# Patient Record
Sex: FEMALE | Race: WHITE | NOT HISPANIC OR LATINO | Employment: FULL TIME | URBAN - METROPOLITAN AREA
[De-identification: names, ages, dates, MRNs, and addresses within clinical notes are randomized per-mention and may not be internally consistent; named-entity substitution may affect disease eponyms.]

---

## 2017-03-02 RX ORDER — LEVOTHYROXINE SODIUM 0.15 MG/1
TABLET ORAL
Qty: 90 TAB | Refills: 0 | Status: SHIPPED | OUTPATIENT
Start: 2017-03-02 | End: 2017-06-04 | Stop reason: SDUPTHER

## 2017-03-02 RX ORDER — GLIPIZIDE 5 MG/1
TABLET ORAL
Qty: 180 TAB | Refills: 0 | Status: SHIPPED | OUTPATIENT
Start: 2017-03-02 | End: 2017-06-04 | Stop reason: SDUPTHER

## 2017-04-01 ENCOUNTER — APPOINTMENT (OUTPATIENT)
Dept: RADIOLOGY | Facility: MEDICAL CENTER | Age: 62
DRG: 690 | End: 2017-04-01
Attending: EMERGENCY MEDICINE
Payer: COMMERCIAL

## 2017-04-01 ENCOUNTER — RESOLUTE PROFESSIONAL BILLING HOSPITAL PROF FEE (OUTPATIENT)
Dept: HOSPITALIST | Facility: MEDICAL CENTER | Age: 62
End: 2017-04-01
Payer: COMMERCIAL

## 2017-04-01 ENCOUNTER — HOSPITAL ENCOUNTER (INPATIENT)
Facility: MEDICAL CENTER | Age: 62
LOS: 2 days | DRG: 690 | End: 2017-04-03
Attending: EMERGENCY MEDICINE | Admitting: HOSPITALIST
Payer: COMMERCIAL

## 2017-04-01 PROBLEM — N39.0 UTI (URINARY TRACT INFECTION): Status: ACTIVE | Noted: 2017-04-01

## 2017-04-01 PROBLEM — N20.0 RIGHT NEPHROLITHIASIS: Status: ACTIVE | Noted: 2017-04-01

## 2017-04-01 PROBLEM — N13.30 HYDRONEPHROSIS, RIGHT: Status: ACTIVE | Noted: 2017-04-01

## 2017-04-01 LAB
AMPHET UR QL SCN: NEGATIVE
ANION GAP SERPL CALC-SCNC: 13 MMOL/L (ref 0–11.9)
APPEARANCE UR: ABNORMAL
BACTERIA #/AREA URNS HPF: ABNORMAL /HPF
BARBITURATES UR QL SCN: NEGATIVE
BASOPHILS # BLD AUTO: 0.4 % (ref 0–1.8)
BASOPHILS # BLD: 0.07 K/UL (ref 0–0.12)
BENZODIAZ UR QL SCN: NEGATIVE
BILIRUB UR QL STRIP.AUTO: NEGATIVE
BUN SERPL-MCNC: 14 MG/DL (ref 8–22)
BZE UR QL SCN: NEGATIVE
CALCIUM SERPL-MCNC: 10.1 MG/DL (ref 8.5–10.5)
CANNABINOIDS UR QL SCN: NEGATIVE
CHLORIDE SERPL-SCNC: 104 MMOL/L (ref 96–112)
CO2 SERPL-SCNC: 23 MMOL/L (ref 20–33)
COLOR UR: ABNORMAL
CREAT SERPL-MCNC: 1.01 MG/DL (ref 0.5–1.4)
CULTURE IF INDICATED INDCX: YES UA CULTURE
EOSINOPHIL # BLD AUTO: 0.05 K/UL (ref 0–0.51)
EOSINOPHIL NFR BLD: 0.3 % (ref 0–6.9)
EPI CELLS #/AREA URNS HPF: ABNORMAL /HPF
ERYTHROCYTE [DISTWIDTH] IN BLOOD BY AUTOMATED COUNT: 40.3 FL (ref 35.9–50)
GFR SERPL CREATININE-BSD FRML MDRD: 56 ML/MIN/1.73 M 2
GLUCOSE SERPL-MCNC: 151 MG/DL (ref 65–99)
GLUCOSE UR STRIP.AUTO-MCNC: NEGATIVE MG/DL
HCT VFR BLD AUTO: 44.1 % (ref 37–47)
HGB BLD-MCNC: 15.1 G/DL (ref 12–16)
IMM GRANULOCYTES # BLD AUTO: 0.07 K/UL (ref 0–0.11)
IMM GRANULOCYTES NFR BLD AUTO: 0.4 % (ref 0–0.9)
KETONES UR STRIP.AUTO-MCNC: NEGATIVE MG/DL
LEUKOCYTE ESTERASE UR QL STRIP.AUTO: ABNORMAL
LYMPHOCYTES # BLD AUTO: 1.29 K/UL (ref 1–4.8)
LYMPHOCYTES NFR BLD: 7.8 % (ref 22–41)
MCH RBC QN AUTO: 29.7 PG (ref 27–33)
MCHC RBC AUTO-ENTMCNC: 34.2 G/DL (ref 33.6–35)
MCV RBC AUTO: 86.8 FL (ref 81.4–97.8)
MDMA UR QL SCN: NEGATIVE
METHADONE UR QL SCN: NEGATIVE
MICRO URNS: ABNORMAL
MONOCYTES # BLD AUTO: 0.81 K/UL (ref 0–0.85)
MONOCYTES NFR BLD AUTO: 4.9 % (ref 0–13.4)
MUCOUS THREADS #/AREA URNS HPF: ABNORMAL /HPF
NEUTROPHILS # BLD AUTO: 14.22 K/UL (ref 2–7.15)
NEUTROPHILS NFR BLD: 86.2 % (ref 44–72)
NITRITE UR QL STRIP.AUTO: NEGATIVE
NRBC # BLD AUTO: 0 K/UL
NRBC BLD AUTO-RTO: 0 /100 WBC
OPIATES UR QL SCN: NEGATIVE
OXYCODONE UR QL SCN: NEGATIVE
PCP UR QL SCN: NEGATIVE
PH UR STRIP.AUTO: 5.5 [PH]
PLATELET # BLD AUTO: 267 K/UL (ref 164–446)
PMV BLD AUTO: 10.6 FL (ref 9–12.9)
POTASSIUM SERPL-SCNC: 4 MMOL/L (ref 3.6–5.5)
PROPOXYPH UR QL SCN: NEGATIVE
PROT UR QL STRIP: NEGATIVE MG/DL
RBC # BLD AUTO: 5.08 M/UL (ref 4.2–5.4)
RBC # URNS HPF: ABNORMAL /HPF
RBC UR QL AUTO: ABNORMAL
SODIUM SERPL-SCNC: 140 MMOL/L (ref 135–145)
SP GR UR STRIP.AUTO: 1.02
WBC # BLD AUTO: 16.5 K/UL (ref 4.8–10.8)
WBC #/AREA URNS HPF: >150 /HPF

## 2017-04-01 PROCEDURE — 85025 COMPLETE CBC W/AUTO DIFF WBC: CPT

## 2017-04-01 PROCEDURE — 700105 HCHG RX REV CODE 258: Performed by: EMERGENCY MEDICINE

## 2017-04-01 PROCEDURE — 81001 URINALYSIS AUTO W/SCOPE: CPT

## 2017-04-01 PROCEDURE — 96375 TX/PRO/DX INJ NEW DRUG ADDON: CPT

## 2017-04-01 PROCEDURE — 700102 HCHG RX REV CODE 250 W/ 637 OVERRIDE(OP): Performed by: EMERGENCY MEDICINE

## 2017-04-01 PROCEDURE — 74176 CT ABD & PELVIS W/O CONTRAST: CPT

## 2017-04-01 PROCEDURE — 96365 THER/PROPH/DIAG IV INF INIT: CPT

## 2017-04-01 PROCEDURE — 99223 1ST HOSP IP/OBS HIGH 75: CPT | Performed by: HOSPITALIST

## 2017-04-01 PROCEDURE — 700111 HCHG RX REV CODE 636 W/ 250 OVERRIDE (IP): Performed by: EMERGENCY MEDICINE

## 2017-04-01 PROCEDURE — 87186 SC STD MICRODIL/AGAR DIL: CPT

## 2017-04-01 PROCEDURE — 700111 HCHG RX REV CODE 636 W/ 250 OVERRIDE (IP): Performed by: HOSPITALIST

## 2017-04-01 PROCEDURE — 80048 BASIC METABOLIC PNL TOTAL CA: CPT

## 2017-04-01 PROCEDURE — 94760 N-INVAS EAR/PLS OXIMETRY 1: CPT

## 2017-04-01 PROCEDURE — 87077 CULTURE AEROBIC IDENTIFY: CPT

## 2017-04-01 PROCEDURE — 99285 EMERGENCY DEPT VISIT HI MDM: CPT

## 2017-04-01 PROCEDURE — 87086 URINE CULTURE/COLONY COUNT: CPT

## 2017-04-01 PROCEDURE — 80307 DRUG TEST PRSMV CHEM ANLYZR: CPT

## 2017-04-01 PROCEDURE — A9270 NON-COVERED ITEM OR SERVICE: HCPCS | Performed by: EMERGENCY MEDICINE

## 2017-04-01 PROCEDURE — 770006 HCHG ROOM/CARE - MED/SURG/GYN SEMI*

## 2017-04-01 RX ORDER — BISACODYL 10 MG
10 SUPPOSITORY, RECTAL RECTAL
Status: DISCONTINUED | OUTPATIENT
Start: 2017-04-01 | End: 2017-04-03 | Stop reason: HOSPADM

## 2017-04-01 RX ORDER — AMLODIPINE BESYLATE 10 MG/1
5 TABLET ORAL DAILY
Status: DISCONTINUED | OUTPATIENT
Start: 2017-04-02 | End: 2017-04-02

## 2017-04-01 RX ORDER — ASPIRIN 81 MG/1
81 TABLET, CHEWABLE ORAL DAILY
Status: DISCONTINUED | OUTPATIENT
Start: 2017-04-02 | End: 2017-04-03 | Stop reason: HOSPADM

## 2017-04-01 RX ORDER — OXYCODONE HYDROCHLORIDE 5 MG/1
5 TABLET ORAL
Status: DISCONTINUED | OUTPATIENT
Start: 2017-04-01 | End: 2017-04-03 | Stop reason: HOSPADM

## 2017-04-01 RX ORDER — MORPHINE SULFATE 4 MG/ML
4 INJECTION, SOLUTION INTRAMUSCULAR; INTRAVENOUS
Status: DISCONTINUED | OUTPATIENT
Start: 2017-04-01 | End: 2017-04-03 | Stop reason: HOSPADM

## 2017-04-01 RX ORDER — SODIUM CHLORIDE 9 MG/ML
1000 INJECTION, SOLUTION INTRAVENOUS ONCE
Status: COMPLETED | OUTPATIENT
Start: 2017-04-01 | End: 2017-04-01

## 2017-04-01 RX ORDER — PHENAZOPYRIDINE HYDROCHLORIDE 200 MG/1
100 TABLET, FILM COATED ORAL ONCE
Status: COMPLETED | OUTPATIENT
Start: 2017-04-01 | End: 2017-04-01

## 2017-04-01 RX ORDER — PROMETHAZINE HYDROCHLORIDE 25 MG/1
12.5-25 TABLET ORAL EVERY 4 HOURS PRN
Status: DISCONTINUED | OUTPATIENT
Start: 2017-04-01 | End: 2017-04-03 | Stop reason: HOSPADM

## 2017-04-01 RX ORDER — ONDANSETRON 2 MG/ML
4 INJECTION INTRAMUSCULAR; INTRAVENOUS EVERY 4 HOURS PRN
Status: DISCONTINUED | OUTPATIENT
Start: 2017-04-01 | End: 2017-04-03 | Stop reason: HOSPADM

## 2017-04-01 RX ORDER — SODIUM CHLORIDE 9 MG/ML
INJECTION, SOLUTION INTRAVENOUS CONTINUOUS
Status: DISCONTINUED | OUTPATIENT
Start: 2017-04-02 | End: 2017-04-03 | Stop reason: HOSPADM

## 2017-04-01 RX ORDER — OXYCODONE HYDROCHLORIDE 10 MG/1
10 TABLET ORAL
Status: DISCONTINUED | OUTPATIENT
Start: 2017-04-01 | End: 2017-04-03 | Stop reason: HOSPADM

## 2017-04-01 RX ORDER — POLYETHYLENE GLYCOL 3350 17 G/17G
1 POWDER, FOR SOLUTION ORAL
Status: DISCONTINUED | OUTPATIENT
Start: 2017-04-01 | End: 2017-04-03 | Stop reason: HOSPADM

## 2017-04-01 RX ORDER — TRAMADOL HYDROCHLORIDE 50 MG/1
50 TABLET ORAL ONCE
Status: DISCONTINUED | OUTPATIENT
Start: 2017-04-01 | End: 2017-04-01

## 2017-04-01 RX ORDER — PROMETHAZINE HYDROCHLORIDE 25 MG/1
12.5-25 SUPPOSITORY RECTAL EVERY 4 HOURS PRN
Status: DISCONTINUED | OUTPATIENT
Start: 2017-04-01 | End: 2017-04-03 | Stop reason: HOSPADM

## 2017-04-01 RX ORDER — ONDANSETRON 4 MG/1
4 TABLET, ORALLY DISINTEGRATING ORAL EVERY 4 HOURS PRN
Status: DISCONTINUED | OUTPATIENT
Start: 2017-04-01 | End: 2017-04-03 | Stop reason: HOSPADM

## 2017-04-01 RX ORDER — ACETAMINOPHEN 325 MG/1
650 TABLET ORAL EVERY 6 HOURS PRN
Status: DISCONTINUED | OUTPATIENT
Start: 2017-04-01 | End: 2017-04-03 | Stop reason: HOSPADM

## 2017-04-01 RX ORDER — ATORVASTATIN CALCIUM 10 MG/1
10 TABLET, FILM COATED ORAL DAILY
Status: DISCONTINUED | OUTPATIENT
Start: 2017-04-02 | End: 2017-04-03 | Stop reason: HOSPADM

## 2017-04-01 RX ORDER — AMOXICILLIN 250 MG
2 CAPSULE ORAL 2 TIMES DAILY
Status: DISCONTINUED | OUTPATIENT
Start: 2017-04-02 | End: 2017-04-03 | Stop reason: HOSPADM

## 2017-04-01 RX ORDER — KETOROLAC TROMETHAMINE 30 MG/ML
30 INJECTION, SOLUTION INTRAMUSCULAR; INTRAVENOUS ONCE
Status: COMPLETED | OUTPATIENT
Start: 2017-04-01 | End: 2017-04-01

## 2017-04-01 RX ORDER — LEVOTHYROXINE SODIUM 0.05 MG/1
150 TABLET ORAL
Status: DISCONTINUED | OUTPATIENT
Start: 2017-04-02 | End: 2017-04-03 | Stop reason: HOSPADM

## 2017-04-01 RX ORDER — DEXTROSE MONOHYDRATE 25 G/50ML
25 INJECTION, SOLUTION INTRAVENOUS
Status: DISCONTINUED | OUTPATIENT
Start: 2017-04-01 | End: 2017-04-03 | Stop reason: HOSPADM

## 2017-04-01 RX ORDER — LOSARTAN POTASSIUM 50 MG/1
100 TABLET ORAL DAILY
Status: DISCONTINUED | OUTPATIENT
Start: 2017-04-02 | End: 2017-04-03 | Stop reason: HOSPADM

## 2017-04-01 RX ORDER — CEFTRIAXONE 2 G/1
2 INJECTION, POWDER, FOR SOLUTION INTRAMUSCULAR; INTRAVENOUS ONCE
Status: COMPLETED | OUTPATIENT
Start: 2017-04-01 | End: 2017-04-01

## 2017-04-01 RX ORDER — ASPIRIN 81 MG/1
81 TABLET, CHEWABLE ORAL DAILY
COMMUNITY

## 2017-04-01 RX ADMIN — SODIUM CHLORIDE 1000 ML: 9 INJECTION, SOLUTION INTRAVENOUS at 20:27

## 2017-04-01 RX ADMIN — KETOROLAC TROMETHAMINE 30 MG: 30 INJECTION, SOLUTION INTRAMUSCULAR; INTRAVENOUS at 20:28

## 2017-04-01 RX ADMIN — CEFTRIAXONE SODIUM 2 G: 2 INJECTION, POWDER, FOR SOLUTION INTRAMUSCULAR; INTRAVENOUS at 20:32

## 2017-04-01 RX ADMIN — PHENAZOPYRIDINE HYDROCHLORIDE 100 MG: 200 TABLET ORAL at 20:34

## 2017-04-01 RX ADMIN — MORPHINE SULFATE 4 MG: 4 INJECTION INTRAVENOUS at 22:45

## 2017-04-01 ASSESSMENT — LIFESTYLE VARIABLES
ALCOHOL_USE: YES
EVER_SMOKED: YES
HAVE YOU EVER FELT YOU SHOULD CUT DOWN ON YOUR DRINKING: NO
TOTAL SCORE: 0
EVER FELT BAD OR GUILTY ABOUT YOUR DRINKING: NO
AVERAGE NUMBER OF DAYS PER WEEK YOU HAVE A DRINK CONTAINING ALCOHOL: 0
ON A TYPICAL DAY WHEN YOU DRINK ALCOHOL HOW MANY DRINKS DO YOU HAVE: 1
EVER HAD A DRINK FIRST THING IN THE MORNING TO STEADY YOUR NERVES TO GET RID OF A HANGOVER: NO
DO YOU DRINK ALCOHOL: NO
HOW MANY TIMES IN THE PAST YEAR HAVE YOU HAD 5 OR MORE DRINKS IN A DAY: 0
HAVE PEOPLE ANNOYED YOU BY CRITICIZING YOUR DRINKING: NO
CONSUMPTION TOTAL: NEGATIVE
TOTAL SCORE: 0
TOTAL SCORE: 0

## 2017-04-01 ASSESSMENT — PAIN SCALES - GENERAL: PAINLEVEL_OUTOF10: 7

## 2017-04-01 NOTE — IP AVS SNAPSHOT
4/3/2017          Devi Caruso  [address] [place] [address]    Dear Devi:    [facility] wants to ensure your discharge home is safe and you or your loved ones have had all your questions answered regarding your care after you leave the hospital.    You may receive a telephone call within two days of your discharge.  This call is to make certain you understand your discharge instructions as well as ensure we provided you with the best care possible during your stay with us.     The call will only last approximately 3-5 minutes and will be done by a nurse.    Once again, we want to ensure your discharge home is safe and that you have a clear understanding of any next steps in your care.  If you have any questions or concerns, please do not hesitate to contact us, we are here for you.  Thank you for choosing [facility] for your healthcare needs.    Sincerely,    Grupo Gold    [facility]

## 2017-04-01 NOTE — IP AVS SNAPSHOT
" <p align=\"LEFT\"><IMG SRC=\"//[email or web address]/blob$/[email or web address]\" alt=\"Image\" WIDTH=\"50%\" HEIGHT=\"200\" BORDER=\"\"></p>                   Name:Devi Caruso  Medical Record Number:[id number]  CSN: [id number]    YOB: 1955   Age: 62 y.o.  Sex: female  HT:1.676 m (5' 6\") WT: 105.2 kg (231 lb 14.8 oz)          Admit Date: 4/1/2017     Discharge Date:   Today's Date: 4/3/2017  Attending Doctor:  Kraig Vázquez M.D.                  Allergies:  Review of patient's allergies indicates no known allergies.          Follow-up Information     1. Follow up with Shon Chambers M.D.. Schedule an appointment as soon as possible for a visit in 1 week.    Specialty:  Urology    Contact information    [address] [place] [id number] [phone]          2. Follow up with SHER Guardado. Schedule an appointment as soon as possible for a visit in 1 week.    Specialty:  Family Medicine    Contact information    [address] [place] [address] [phone]           Medication List      Take these Medications        Instructions    amlodipine 5 MG Tabs   Commonly known as:  NORVASC    Take 1 Tab by mouth every day.   Dose:  5 mg       aspirin 81 MG Chew chewable tablet   Commonly known as:  ASA    Take 81 mg by mouth every day.   Dose:  81 mg       atorvastatin 10 MG Tabs   Commonly known as:  LIPITOR    Take 1 Tab by mouth every day.   Dose:  10 mg       ciprofloxacin 500 MG Tabs   Commonly known as:  CIPRO    Take 1 Tab by mouth 2 times a day for 9 days.   Dose:  500 mg       glipiZIDE 5 MG Tabs   Commonly known as:  GLUCOTROL    TAKE ONE TABLET BY MOUTH TWICE DAILY       levothyroxine 150 MCG Tabs   Commonly known as:  SYNTHROID    Take 150 mcg by mouth every evening.   Dose:  150 mcg       losartan 100 MG Tabs   Commonly known as:  COZAAR    Take 1 Tab by mouth every day.   Dose:  100 mg       metformin 1000 MG tablet   Commonly known as:  GLUCOPHAGE    Take 1 Tab by mouth 2 times a day, with " meals.   Dose:  1000 mg       MULTIVITAL PO    Take 1 Tab by mouth every day.   Dose:  1 Tab

## 2017-04-01 NOTE — IP AVS SNAPSHOT
" Home Care Instructions                                                                                                                  Name:Devi Caruso  Medical Record Number:[id number]  CSN: [id number]    YOB: 1955   Age: 62 y.o.  Sex: female  HT:1.676 m (5' 6\") WT: 105.2 kg (231 lb 14.8 oz)          Admit Date: 4/1/2017     Discharge Date:   Today's Date: 4/3/2017  Attending Doctor:  Kraig Vázquez M.D.                  Allergies:  Review of patient's allergies indicates no known allergies.            Discharge Instructions       YOB: 1955   Age: 62 y.o.               Admit Date: 4/1/2017     Discharge Date: 4/3/2017  Attending Doctor:  Kraig Vázquez M.D.                  Allergies:  Review of patient's allergies indicates no known allergies.  Medical History (as on file):   Past Medical History   Diagnosis Date   • Epistaxis 10/30/2013     6 bloody in last few days Not common for patient Lot of sinus trouble in the last week   • Unspecified hypothyroidism 10/30/2013   • Dyslipidemia 10/30/2013   • HTN (hypertension) 10/30/2013     Losartan 100 Doesn't check at home   • Diabetes ([facility])      oral meds   • Renal disorder      kidney stones     Past Surgical History   Procedure Laterality Date   • Cataract extraction with iol  2015   • Thyroidectomy  1985   • Mass excision general  7/14/2016     Procedure: MASS EXCISION GENERAL SUBCUTANEOUS NECK;  Surgeon: Bhavik Bowen M.D.;  Location: SURGERY [facility];  Service:    • Cystoscopy stent placement Right 4/2/2017     Procedure: CYSTOSCOPY STENT PLACEMENT;  Surgeon: Shon Chambers M.D.;  Location: SURGERY [facility];  Service:        Discharge Instructions  Blood Pressure: 125/72 mmHg  Weight: 105.2 kg (231 lb 14.8 oz)    Discharged to home by car with relative. Discharged via wheelchair, hospital escort: Yes.    Special equipment needed: Not Applicable    Belongings with: Personal    Instructions:    Follow up with Sherry" SHER Garduno In 1-2 weeks  Follow up with Dr. Chambers, Urology in 1 week.    Be sure to schedule a follow-up appointment with your primary care doctor or any specialists as instructed.     Discharge Plan:   Diet Plan: Discussed  Activity Level: Discussed  Confirmed Follow up Appointment: Patient to Call and Schedule Appointment  Confirmed Symptoms Management: Discussed  Medication Reconciliation Updated: Yes  Influenza Vaccine Indication: Patient Refuses    DIET:  You may eat any foods that you can tolerate.  It is a good idea to eat a high fiber diet and take in plenty of fluids to prevent constipation.  If you do become constipated you may want to take a mild laxative or take ducolax tablets on a daily basis until your bowel habits are regular.  Constipation can be very uncomfortable, along with straining, after recent surgery.    I understand that a diet low in cholesterol, fat, and sodium is recommended for good health. Unless I have been given specific instructions below for another diet, I accept this instruction as my diet prescription.       Discharge Medication Instructions:    Below are the medications your physician expects you to take upon discharge:    Review all your home medications and newly ordered medications with your doctor and/or pharmacist. Follow medication instructions as directed by your doctor and/or pharmacist.  GENERAL POST-OPERATIVE  PATIENT INSTRUCTIONS      FOLLOW-UP:  Please call your physician/ return to ER if you have any fevers greater than 100.4, drainage from your wound that is not clear or looks infected, persistent bleeding, increasing abdominal pain, problems urinating, or persistent nausea/vomiting.      WOUND CARE INSTRUCTIONS:  Keep a dry clean dressing on the wound if there is drainage. The initial bandage may be removed after 24 hours.  Once the wound has quit draining you may leave it open to air.  If clothing rubs against the wound or causes irritation and the  wound is not draining you may cover it with a dry dressing during the daytime.  Try to keep the wound dry and avoid ointments on the wound unless directed to do so.  If the wound becomes bright red and painful or starts to drain infected material that is not clear, please contact your physician immediately.  If the wound is mildly pink and has a thick firm ridge underneath it, this is normal, and is referred to as a healing ridge.  This will resolve over the next 4-6 weeks. No baths, hot tubs, swimming, no submerging incisions, unit til healed.     ACTIVITY:  You are encouraged to cough and deep breath or use your incentive spirometer if you were given one, every 15-30 minutes when awake.  This will help prevent respiratory complications and low grade fevers post-operatively if you had a general anesthetic.  You may want to hug a pillow when coughing and sneezing to add additional support to the surgical area, if you had abdominal or chest surgery, which will decrease pain during these times.  You are encouraged to walk and engage in light activity for the next two weeks.  You should not lift more than 10 pounds during this time frame as it could put you at increased risk for complications.      MEDICATIONS:  Try to take narcotic medications and anti-inflammatory medications, such as tylenol, ibuprofen, naprosyn, etc., with food.  This will minimize stomach upset from the medication.  Should you develop nausea and vomiting from the pain medication, or develop a rash, please discontinue the medication and contact your physician.  You should not drive, make important decisions, or operate machinery when taking narcotic pain medication.    QUESTIONS:  Please feel free to call your physician or the hospital  if you have any questions, and they will be glad to assist you.           Discharge Instructions        Be sure to schedule a follow-up appointment with your primary care doctor or any specialists as  instructed.     Discharge Plan:   Diet Plan: Discussed  Activity Level: Discussed  Confirmed Follow up Appointment: Patient to Call and Schedule Appointment  Confirmed Symptoms Management: Discussed  Medication Reconciliation Updated: Yes  Influenza Vaccine Indication: Patient Refuses    I understand that a diet low in cholesterol, fat, and sodium is recommended for good health. Unless I have been given specific instructions below for another diet, I accept this instruction as my diet prescription.   Other diet: regular    Special Instructions: None    · Is patient discharged on Warfarin / Coumadin?   No     · Is patient Post Blood Transfusion?  No    Depression / Suicide Risk    As you are discharged from this Critical access hospital facility, it is important to learn how to keep safe from harming yourself.    Recognize the warning signs:  · Abrupt changes in personality, positive or negative- including increase in energy   · Giving away possessions  · Change in eating patterns- significant weight changes-  positive or negative  · Change in sleeping patterns- unable to sleep or sleeping all the time   · Unwillingness or inability to communicate  · Depression  · Unusual sadness, discouragement and loneliness  · Talk of wanting to die  · Neglect of personal appearance   · Rebelliousness- reckless behavior  · Withdrawal from people/activities they love  · Confusion- inability to concentrate     If you or a loved one observes any of these behaviors or has concerns about self-harm, here's what you can do:  · Talk about it- your feelings and reasons for harming yourself  · Remove any means that you might use to hurt yourself (examples: pills, rope, extension cords, firearm)  · Get professional help from the community (Mental Health, Substance Abuse, psychological counseling)  · Do not be alone:Call your Safe Contact- someone whom you trust who will be there for you.  · Call your local CRISIS HOTLINE 629-5860 or  388.512.5334  · Call your local Children's Mobile Crisis Response Team [place] (903) 606-7578 or www.Shopsy.Intermedia  · Call the toll free National Suicide Prevention Hotlines   · National Suicide Prevention Lifeline 267-878-BALN (9499)  · National Hope Line Network 800-SUICIDE (501-1255)        Follow-up Information     1. Follow up with Shon Chambers M.D.. Schedule an appointment as soon as possible for a visit in 1 week.    Specialty:  Urology    Contact information    [address] [place] [id number] [phone]          2. Follow up with SHER Guardado. Schedule an appointment as soon as possible for a visit in 1 week.    Specialty:  Family Medicine    Contact information    [address] [place] [address] [phone]           Discharge Medication Instructions:    Below are the medications your physician expects you to take upon discharge:    Review all your home medications and newly ordered medications with your doctor and/or pharmacist. Follow medication instructions as directed by your doctor and/or pharmacist.    Please keep your medication list with you and share with your physician.               Medication List      START taking these medications        Instructions    atorvastatin 10 MG Tabs   Last time this was given:  10 mg on 4/3/2017  8:06 AM   Commonly known as:  LIPITOR    Take 1 Tab by mouth every day.   Dose:  10 mg       ciprofloxacin 500 MG Tabs   Commonly known as:  CIPRO    Take 1 Tab by mouth 2 times a day for 9 days.   Dose:  500 mg         CONTINUE taking these medications        Instructions    amlodipine 5 MG Tabs   Last time this was given:  5 mg on 4/2/2017  8:23 PM   Commonly known as:  NORVASC    Take 1 Tab by mouth every day.   Dose:  5 mg       aspirin 81 MG Chew chewable tablet   Last time this was given:  81 mg on 4/3/2017  8:07 AM   Commonly known as:  ASA    Take 81 mg by mouth every day.   Dose:  81 mg       glipiZIDE 5 MG Tabs    Commonly known as:  GLUCOTROL    TAKE ONE TABLET BY MOUTH TWICE DAILY       levothyroxine 150 MCG Tabs   Last time this was given:  150 mcg on 4/3/2017  5:40 AM   Commonly known as:  SYNTHROID    Take 150 mcg by mouth every evening.   Dose:  150 mcg       losartan 100 MG Tabs   Last time this was given:  100 mg on 4/3/2017  8:07 AM   Commonly known as:  COZAAR    Take 1 Tab by mouth every day.   Dose:  100 mg       metformin 1000 MG tablet   Commonly known as:  GLUCOPHAGE    Take 1 Tab by mouth 2 times a day, with meals.   Dose:  1000 mg       MULTIVITAL PO    Take 1 Tab by mouth every day.   Dose:  1 Tab               Instructions           Diet / Nutrition:    Follow any diet instructions given to you by your doctor or the dietician, including how much salt (sodium) you are allowed each day.    If you are overweight, talk to your doctor about a weight reduction plan.    Activity:    Remain physically active following your doctor's instructions about exercise and activity.    Rest often.     Any time you become even a little tired or short of breath, SIT DOWN and rest.    Worsening Symptoms:    Report any of the following signs and symptoms to the doctor's office immediately:    *Pain of jaw, arm, or neck  *Chest pain not relieved by medication                               *Dizziness or loss of consciousness  *Difficulty breathing even when at rest   *More tired than usual                                       *Bleeding drainage or swelling of surgical site  *Swelling of feet, ankles, legs or stomach                 *Fever (>100ºF)  *Pink or blood tinged sputum  *Weight gain (3lbs/day or 5lbs /week)           *Shock from internal defibrillator (if applicable)  *Palpitations or irregular heartbeats                *Cool and/or numb extremities    Stroke Awareness    Common Risk Factors for Stroke include:    Age  Atrial Fibrillation  Carotid Artery Stenosis  Diabetes Mellitus  Excessive alcohol consumption  High  blood pressure  Overweight   Physical inactivity  Smoking    Warning signs and symptoms of a stroke include:    *Sudden numbness or weakness of the face, arm or leg (especially on one side of the body).  *Sudden confusion, trouble speaking or understanding.  *Sudden trouble seeing in one or both eyes.  *Sudden trouble walking, dizziness, loss of balance or coordination.Sudden severe headache with no known cause.    It is very important to get treatment quickly when a stroke occurs. If you experience any of the above warning signs, call 911 immediately.                   Disclaimer         Quit Smoking / Tobacco Use:    I understand the use of any tobacco products increases my chance of suffering from future heart disease or stroke and could cause other illnesses which may shorten my life. Quitting the use of tobacco products is the single most important thing I can do to improve my health. For further information on smoking / tobacco cessation call a Toll Free Quit Line at 1-620.139.4610 (*National Cancer [place]) or 1-796.498.4557 (American Lung Association) or you can access the web based program at www.lungFlatClub.org.    [place] Tobacco Users Help Line:  (801) 325-8071       Toll Free: 1-635.517.3533  Quit Tobacco Program [facility] (718)398-8921    Crisis Hotline:    [place] Crisis Hotline:  8-251-VWSTEAG or 1-346.905.4473    [place] Crisis Hotline:    1-437.779.5743 or 073-356-1071    Discharge Survey:   Thank you for choosing [facility]. We hope we did everything we could to make your hospital stay a pleasant one. You may be receiving a phone survey and we would appreciate your time and participation in answering the questions. Your input is very valuable to us in our efforts to improve our service to our patients and their families.        My signature on this form indicates that:    1. I have reviewed and understand the above information.  2. My questions regarding this information  have been answered to my satisfaction.  3. I have formulated a plan with my discharge nurse to obtain my prescribed medications for home.                  Disclaimer         __________________________________                     __________       ________                       Patient Signature                                                 Date                    Time

## 2017-04-01 NOTE — IP AVS SNAPSHOT
[employer] Access Code: [email or web address]  Expires: 4/10/2017 11:29 AM    [employer]  A secure, online tool to manage your health information     [employer]’s [employer]® is a secure, online tool that connects you to your personalized health information from the privacy of your home -- day or night - making it very easy for you to manage your healthcare. Once the activation process is completed, you can even access your medical information using the [employer] marcos, which is available for free in the Apple Marcos store or Google Play store.     [employer] provides the following levels of access (as shown below):   My Chart Features   [facility] Primary Care Doctor [facility]  Specialists [facility]  Urgent  Care Non-[facility]  Primary Care  Doctor   Email your healthcare team securely and privately 24/7 X X X X   Manage appointments: schedule your next appointment; view details of past/upcoming appointments X      Request prescription refills. X      View recent personal medical records, including lab and immunizations X X X X   View health record, including health history, allergies, medications X X X X   Read reports about your outpatient visits, procedures, consult and ER notes X X X X   See your discharge summary, which is a recap of your hospital and/or ER visit that includes your diagnosis, lab results, and care plan. X X       How to register for [employer]:  1. Go to  [email or web address].  2. Click on the Sign Up Now box, which takes you to the New Member Sign Up page. You will need to provide the following information:  a. Enter your [employer] Access Code exactly as it appears at the top of this page. (You will not need to use this code after you’ve completed the sign-up process. If you do not sign up before the expiration date, you must request a new code.)   b. Enter your date of birth.   c. Enter your home email address.   d. Click Submit, and follow the next screen’s instructions.  3. Create a [employer] ID. This will be your [employer]  login ID and cannot be changed, so think of one that is secure and easy to remember.  4. Create a [employer] password. You can change your password at any time.  5. Enter your Password Reset Question and Answer. This can be used at a later time if you forget your password.   6. Enter your e-mail address. This allows you to receive e-mail notifications when new information is available in [employer].  7. Click Sign Up. You can now view your health information.    For assistance activating your [employer] account, call [phone]

## 2017-04-02 ENCOUNTER — APPOINTMENT (OUTPATIENT)
Dept: RADIOLOGY | Facility: MEDICAL CENTER | Age: 62
DRG: 690 | End: 2017-04-02
Attending: UROLOGY
Payer: COMMERCIAL

## 2017-04-02 PROBLEM — E78.5 HYPERLIPIDEMIA: Status: ACTIVE | Noted: 2017-04-02

## 2017-04-02 LAB
ALBUMIN SERPL BCP-MCNC: 3.8 G/DL (ref 3.2–4.9)
BASOPHILS # BLD AUTO: 0.4 % (ref 0–1.8)
BASOPHILS # BLD: 0.06 K/UL (ref 0–0.12)
BUN SERPL-MCNC: 12 MG/DL (ref 8–22)
CALCIUM SERPL-MCNC: 9.2 MG/DL (ref 8.5–10.5)
CHLORIDE SERPL-SCNC: 107 MMOL/L (ref 96–112)
CO2 SERPL-SCNC: 21 MMOL/L (ref 20–33)
CREAT SERPL-MCNC: 1.02 MG/DL (ref 0.5–1.4)
EKG IMPRESSION: NORMAL
EOSINOPHIL # BLD AUTO: 0 K/UL (ref 0–0.51)
EOSINOPHIL NFR BLD: 0 % (ref 0–6.9)
ERYTHROCYTE [DISTWIDTH] IN BLOOD BY AUTOMATED COUNT: 42.1 FL (ref 35.9–50)
GFR SERPL CREATININE-BSD FRML MDRD: 55 ML/MIN/1.73 M 2
GLUCOSE BLD-MCNC: 111 MG/DL (ref 65–99)
GLUCOSE BLD-MCNC: 140 MG/DL (ref 65–99)
GLUCOSE BLD-MCNC: 144 MG/DL (ref 65–99)
GLUCOSE BLD-MCNC: 181 MG/DL (ref 65–99)
GLUCOSE SERPL-MCNC: 170 MG/DL (ref 65–99)
HCT VFR BLD AUTO: 41.6 % (ref 37–47)
HGB BLD-MCNC: 13.8 G/DL (ref 12–16)
IMM GRANULOCYTES # BLD AUTO: 0.07 K/UL (ref 0–0.11)
IMM GRANULOCYTES NFR BLD AUTO: 0.4 % (ref 0–0.9)
LYMPHOCYTES # BLD AUTO: 0.62 K/UL (ref 1–4.8)
LYMPHOCYTES NFR BLD: 3.6 % (ref 22–41)
MCH RBC QN AUTO: 29.6 PG (ref 27–33)
MCHC RBC AUTO-ENTMCNC: 33.2 G/DL (ref 33.6–35)
MCV RBC AUTO: 89.1 FL (ref 81.4–97.8)
MONOCYTES # BLD AUTO: 1.12 K/UL (ref 0–0.85)
MONOCYTES NFR BLD AUTO: 6.6 % (ref 0–13.4)
NEUTROPHILS # BLD AUTO: 15.14 K/UL (ref 2–7.15)
NEUTROPHILS NFR BLD: 89 % (ref 44–72)
NRBC # BLD AUTO: 0 K/UL
NRBC BLD AUTO-RTO: 0 /100 WBC
PHOSPHATE SERPL-MCNC: 2.6 MG/DL (ref 2.5–4.5)
PLATELET # BLD AUTO: 194 K/UL (ref 164–446)
PMV BLD AUTO: 10.9 FL (ref 9–12.9)
POTASSIUM SERPL-SCNC: 3.9 MMOL/L (ref 3.6–5.5)
RBC # BLD AUTO: 4.67 M/UL (ref 4.2–5.4)
SODIUM SERPL-SCNC: 140 MMOL/L (ref 135–145)
WBC # BLD AUTO: 17 K/UL (ref 4.8–10.8)

## 2017-04-02 PROCEDURE — 700101 HCHG RX REV CODE 250

## 2017-04-02 PROCEDURE — 93005 ELECTROCARDIOGRAM TRACING: CPT | Performed by: ANESTHESIOLOGY

## 2017-04-02 PROCEDURE — A4357 BEDSIDE DRAINAGE BAG: HCPCS | Performed by: UROLOGY

## 2017-04-02 PROCEDURE — 770006 HCHG ROOM/CARE - MED/SURG/GYN SEMI*

## 2017-04-02 PROCEDURE — 99233 SBSQ HOSP IP/OBS HIGH 50: CPT | Performed by: INTERNAL MEDICINE

## 2017-04-02 PROCEDURE — 502240 HCHG MISC OR SUPPLY RC 0272: Performed by: UROLOGY

## 2017-04-02 PROCEDURE — C1769 GUIDE WIRE: HCPCS | Performed by: UROLOGY

## 2017-04-02 PROCEDURE — 700111 HCHG RX REV CODE 636 W/ 250 OVERRIDE (IP)

## 2017-04-02 PROCEDURE — 85025 COMPLETE CBC W/AUTO DIFF WBC: CPT

## 2017-04-02 PROCEDURE — 93010 ELECTROCARDIOGRAM REPORT: CPT | Performed by: INTERNAL MEDICINE

## 2017-04-02 PROCEDURE — 700111 HCHG RX REV CODE 636 W/ 250 OVERRIDE (IP): Performed by: NURSE PRACTITIONER

## 2017-04-02 PROCEDURE — A4606 OXYGEN PROBE USED W OXIMETER: HCPCS | Performed by: UROLOGY

## 2017-04-02 PROCEDURE — 700111 HCHG RX REV CODE 636 W/ 250 OVERRIDE (IP): Performed by: HOSPITALIST

## 2017-04-02 PROCEDURE — 160036 HCHG PACU - EA ADDL 30 MINS PHASE I: Performed by: UROLOGY

## 2017-04-02 PROCEDURE — 501329 HCHG SET, CYSTO IRRIG Y TUR: Performed by: UROLOGY

## 2017-04-02 PROCEDURE — 700102 HCHG RX REV CODE 250 W/ 637 OVERRIDE(OP)

## 2017-04-02 PROCEDURE — A9270 NON-COVERED ITEM OR SERVICE: HCPCS | Performed by: HOSPITALIST

## 2017-04-02 PROCEDURE — C2617 STENT, NON-COR, TEM W/O DEL: HCPCS | Performed by: UROLOGY

## 2017-04-02 PROCEDURE — 160048 HCHG OR STATISTICAL LEVEL 1-5: Performed by: UROLOGY

## 2017-04-02 PROCEDURE — 700105 HCHG RX REV CODE 258: Performed by: HOSPITALIST

## 2017-04-02 PROCEDURE — A9270 NON-COVERED ITEM OR SERVICE: HCPCS | Performed by: NURSE PRACTITIONER

## 2017-04-02 PROCEDURE — 500879 HCHG PACK, CYSTO: Performed by: UROLOGY

## 2017-04-02 PROCEDURE — 80069 RENAL FUNCTION PANEL: CPT

## 2017-04-02 PROCEDURE — 700102 HCHG RX REV CODE 250 W/ 637 OVERRIDE(OP): Performed by: NURSE PRACTITIONER

## 2017-04-02 PROCEDURE — 160028 HCHG SURGERY MINUTES - 1ST 30 MINS LEVEL 3: Performed by: UROLOGY

## 2017-04-02 PROCEDURE — 110382 HCHG SHELL REV 271: Performed by: UROLOGY

## 2017-04-02 PROCEDURE — 160035 HCHG PACU - 1ST 60 MINS PHASE I: Performed by: UROLOGY

## 2017-04-02 PROCEDURE — 700102 HCHG RX REV CODE 250 W/ 637 OVERRIDE(OP): Performed by: HOSPITALIST

## 2017-04-02 PROCEDURE — A9270 NON-COVERED ITEM OR SERVICE: HCPCS

## 2017-04-02 PROCEDURE — 160002 HCHG RECOVERY MINUTES (STAT): Performed by: UROLOGY

## 2017-04-02 PROCEDURE — 82962 GLUCOSE BLOOD TEST: CPT | Mod: 91

## 2017-04-02 PROCEDURE — 160009 HCHG ANES TIME/MIN: Performed by: UROLOGY

## 2017-04-02 PROCEDURE — 36415 COLL VENOUS BLD VENIPUNCTURE: CPT

## 2017-04-02 PROCEDURE — 0T768DZ DILATION OF RIGHT URETER WITH INTRALUMINAL DEVICE, VIA NATURAL OR ARTIFICIAL OPENING ENDOSCOPIC: ICD-10-PCS | Performed by: UROLOGY

## 2017-04-02 DEVICE — STENT UROLOGICAL POLARIS 6X24  ULTRA: Type: IMPLANTABLE DEVICE | Status: FUNCTIONAL

## 2017-04-02 RX ORDER — AMLODIPINE BESYLATE 10 MG/1
5 TABLET ORAL
Status: DISCONTINUED | OUTPATIENT
Start: 2017-04-02 | End: 2017-04-03 | Stop reason: HOSPADM

## 2017-04-02 RX ORDER — ENALAPRILAT 1.25 MG/ML
1.25 INJECTION INTRAVENOUS EVERY 6 HOURS PRN
Status: DISCONTINUED | OUTPATIENT
Start: 2017-04-02 | End: 2017-04-03 | Stop reason: HOSPADM

## 2017-04-02 RX ORDER — OXYCODONE HCL 5 MG/5 ML
SOLUTION, ORAL ORAL
Status: COMPLETED
Start: 2017-04-02 | End: 2017-04-02

## 2017-04-02 RX ORDER — SODIUM CHLORIDE 9 MG/ML
500 INJECTION, SOLUTION INTRAVENOUS ONCE
Status: COMPLETED | OUTPATIENT
Start: 2017-04-02 | End: 2017-04-02

## 2017-04-02 RX ADMIN — SODIUM CHLORIDE: 9 INJECTION, SOLUTION INTRAVENOUS at 17:37

## 2017-04-02 RX ADMIN — STANDARDIZED SENNA CONCENTRATE AND DOCUSATE SODIUM 2 TABLET: 8.6; 5 TABLET, FILM COATED ORAL at 10:02

## 2017-04-02 RX ADMIN — ATORVASTATIN CALCIUM 10 MG: 10 TABLET, FILM COATED ORAL at 10:03

## 2017-04-02 RX ADMIN — LOSARTAN POTASSIUM 100 MG: 50 TABLET, FILM COATED ORAL at 10:02

## 2017-04-02 RX ADMIN — ONDANSETRON 4 MG: 2 INJECTION, SOLUTION INTRAMUSCULAR; INTRAVENOUS at 00:25

## 2017-04-02 RX ADMIN — AMLODIPINE BESYLATE 5 MG: 10 TABLET ORAL at 02:56

## 2017-04-02 RX ADMIN — ENALAPRILAT 1.25 MG: 1.25 INJECTION INTRAVENOUS at 00:28

## 2017-04-02 RX ADMIN — SODIUM CHLORIDE: 9 INJECTION, SOLUTION INTRAVENOUS at 01:41

## 2017-04-02 RX ADMIN — STANDARDIZED SENNA CONCENTRATE AND DOCUSATE SODIUM 2 TABLET: 8.6; 5 TABLET, FILM COATED ORAL at 20:23

## 2017-04-02 RX ADMIN — SODIUM CHLORIDE 500 ML: 9 INJECTION, SOLUTION INTRAVENOUS at 00:45

## 2017-04-02 RX ADMIN — LEVOTHYROXINE SODIUM 150 MCG: 50 TABLET ORAL at 05:38

## 2017-04-02 RX ADMIN — AMLODIPINE BESYLATE 5 MG: 10 TABLET ORAL at 20:23

## 2017-04-02 RX ADMIN — CEFTRIAXONE 2 G: 2 INJECTION, POWDER, FOR SOLUTION INTRAMUSCULAR; INTRAVENOUS at 10:26

## 2017-04-02 RX ADMIN — ACETAMINOPHEN 650 MG: 325 TABLET, FILM COATED ORAL at 21:31

## 2017-04-02 RX ADMIN — ASPIRIN 81 MG: 81 TABLET, CHEWABLE ORAL at 10:03

## 2017-04-02 RX ADMIN — OXYCODONE HYDROCHLORIDE 10 MG: 5 SOLUTION ORAL at 08:22

## 2017-04-02 ASSESSMENT — PAIN SCALES - GENERAL
PAINLEVEL_OUTOF10: 2
PAINLEVEL_OUTOF10: 5
PAINLEVEL_OUTOF10: 2
PAINLEVEL_OUTOF10: 0
PAINLEVEL_OUTOF10: 2
PAINLEVEL_OUTOF10: 2
PAINLEVEL_OUTOF10: 0
PAINLEVEL_OUTOF10: 0
PAINLEVEL_OUTOF10: 5

## 2017-04-02 NOTE — PROGRESS NOTES
Hospital Medicine Interval Note  Date of Service:  4/2/2017    Chief Complaint  62 y.o.-year-old female admitted 4/1/2017 with Painful Urination and Flank Pain    Interval Problem Update  Chart reviewed. She is planned for cystoscopy today.    Consultants/Specialty  Dr. Chambers, Urology    Disposition  As per Urology.     ROS   GENERAL: No fevers or chills.  HEENT: No nasal congestion, epistaxis. No hearing loss.  EYES: No vision loss.  SKIN: No new or obvious rashes.  RESPIRATORY: No shortness of breath, coughing, wheezing, hemoptysis.  CARDIOVASCULAR: No chest pain, dyspnea on exertion, palpitations, murmur or claudications.  GASTROINTESTINAL: No nausea, vomiting, diarrhea, constipation. No abdominal pain.  GENITOURINARY: Flank pain. No incontinence.  MUSOSKELETAL: No falls, myalgias or arthralgias.  NEUROLOGIC: No headaches, loss of consciousness or seizure activity.  PSYCHIATRIC: Stable mood. Not currently anxious or depressed.     Physical Exam Laboratory/Imaging   Filed Vitals:    04/02/17 0400 04/02/17 0708 04/02/17 0805 04/02/17 0815   BP: 117/67 98/55     Pulse: 106 98 99 94   Temp: 36.7 °C (98 °F) 37.1 °C (98.8 °F) 36.2 °C (97.1 °F)    Resp: 20 18 15 11   Height:       Weight:       SpO2: 97% 98% 95% 96%       Intake/Output Summary (Last 24 hours) at 04/02/17 0856  Last data filed at 04/02/17 0800   Gross per 24 hour   Intake   1425 ml   Output    300 ml   Net   1125 ml     Physical Exam     Intake/Output Summary (Last 24 hours) at 04/02/17 1414  Last data filed at 04/02/17 1226   Gross per 24 hour   Intake   1585 ml   Output    300 ml   Net   1285 ml     Vitals Reviewed  General/Constitutional: No acute distress.   Head: Normocephalic, atraumatic  ENT: Oral mucosa is moist. No obvious pharyngeal exudates  Eyes: Pink conjunctiva, no scleral icterus  Neck: Supple, no lymphadenopathy  Cardiovascular: Normal rate and regular rhythm. S1,2 noted. No murmurs, gallops or rubs.  Pulmonary: Clear to auscultation  bilaterally. No wheezes, rales or rhonchi  Abdominal: Soft, nontender, not distended, bowel sounds normoactive.  Musculoskeletal: No tenderness to palpation of chest wall.  Mild flank tenderness  Neurologic: Grossly nonfocal, moving all extremities.  Genitourinary: No gross hematuria  Skin: No obvious rash.  Psychiatric: Pleasant, cooperative.  Labs Reviewed  Imaging reviewed   Lab Results   Component Value Date/Time    WBC 17.0* 04/02/2017 02:14 AM    HEMOGLOBIN 13.8 04/02/2017 02:14 AM    HEMATOCRIT 41.6 04/02/2017 02:14 AM    PLATELET COUNT 194 04/02/2017 02:14 AM     Lab Results   Component Value Date/Time    SODIUM 140 04/02/2017 02:14 AM    POTASSIUM 3.9 04/02/2017 02:14 AM    CHLORIDE 107 04/02/2017 02:14 AM    CO2 21 04/02/2017 02:14 AM    GLUCOSE 170* 04/02/2017 02:14 AM    BUN 12 04/02/2017 02:14 AM    CREATININE 1.02 04/02/2017 02:14 AM      Assessment/Plan    * Right nephrolithiasis  Assessment & Plan  Dr. Chambers consulted; planned for cytoscopy and possible stent placement.    Hydronephrosis, right  Assessment & Plan  As above.    Hypothyroidism  Assessment & Plan  Continue Synthroid.    HTN (hypertension)  Assessment & Plan  Stable. Continue losartan, amlodipine.    UTI (urinary tract infection)  Assessment & Plan  On Rocephin. Will await urine culture to be obtained at cystoscopy and follow.    Hyperlipidemia  Assessment & Plan  Continue statin.    I spent 35 minutes, reviewing the chart, notes, vitals, labs, imaging, ordering labs, evaluating Devi Caruso for assessment, enacting the plan above and writing this note. Time was devoted to counseling and coordinating care including review of records, pertinent lab data and studies, as well as discussing diagnostic evaluation and work up, planned therapeutic interventions and future disposition of care. Where indicated, the assessment and plan reflect discussion of patient with consultants, other healthcare providers, family members, and  additional research needed to obtain further information in formulating the plan of care for Devi Caruso.            DVT Prophylaxis: Contraindicated - High bleeding risk          Hematuria and stone; hence no pharmacologic DVT prophylaxis.

## 2017-04-02 NOTE — CONSULTS
DATE OF SERVICE:  04/02/2017    Urology service was consulted by Dr. Hernandez of the emergency department for   evaluation of pain regarding this woman's large kidney stone and possibly   urinary tract infection.    HISTORY OF PRESENT ILLNESS:  The patient is 62 who presented to the emergency   department with acute onset of right-sided flank and abdominal pain.  This is   also been associated with nausea.  The pain was really not mitigated with   anti-inflammatory medicines.  She does have a history of known kidney stones   as well as diabetes.  There has also been some dysuria reported.  No chills,   lightheadedness, dizziness or hematuria.    For detailed account of the patient's past medical, surgical, social history   and review of systems, please see Dr. Hernandez's H and P dated 04/01/2017 in   the patient's chart.    PHYSICAL EXAMINATION:  GENERAL:  Elderly woman, obese, no acute distress.  VITAL SIGNS:  Temperature 37.1, pulse 98, blood pressure 98/55.  HEENT:  Oropharynx is clear, no cervical lymphadenopathy.  CHEST:  Rise is symmetric.  HEART:  Regular, 2+ radial pulses bilaterally.  SKIN:  Warm and dry.  NEUROLOGIC:  Grossly intact.  EXTREMITIES:  No lower extremity edema.  ABDOMEN:  Soft, obese.    IMAGING:  CT scan reviewed.  There is somewhat atrophic and moderately   hydronephrotic right kidney.  There is a 1.7-cm round ureteropelvic junction   stone.  There are no stones on the left side or other stones on the right   side.    LABORATORY DATA:  White blood cell count 17,000, 89% neutrophils, creatinine   1.02, potassium 3.9.  Urinalysis is notable for greater than 150 white cells,   2-5 red cells, moderate bacteria.    IMPRESSION AND PLAN:  A 62-year-old woman with a large obstructing right-sided   kidney stone with colic and possible urinary tract infection.  She is   diabetic and showing signs of hypotension in the setting of normally   hypertensive status.  Indications are for placement of _____  urinary stent.    Rationale was discussed with the patient.  I explained the potential risks of   procedure, which include, but not limited to bleeding, infection, stent colic,   need for further stone treatment, absolute need for followup to deal with her   stent.  The patient states she will be traveling out of town next week to   deal with personal matters, but gives assurances of followup going forward.       ____________________________________     TRAVIS ZUÑIGA MD MCM / FLETCHER    DD:  04/02/2017 07:34:30  DT:  04/02/2017 09:03:00    D#:  [id number]  Job#:  [id number]

## 2017-04-02 NOTE — ED NOTES
"Pt amb to room 16 with   Chief Complaint   Patient presents with   • Painful Urination     voiding small aounts of urine   • Flank Pain     rt     Agree with triage note. Urine collected and sent to lab.  Pt reports she was supposed to have a RT kidney surgery (stent placement) 11/2015 but \"put it off and never rescheduled\".  Feels like she has been running a fever.  +N, denies emesis.     "

## 2017-04-02 NOTE — PROGRESS NOTES
Report given to pre-op RN. Pre-op checklist partially completed with BRENDA Méndez. Will update monitor room when pt leaves floor.

## 2017-04-02 NOTE — PROGRESS NOTES
Call placed to MD, patient sinus tach on heart monitor, vitals , RR 22, /92, temp 99.3, per MD administered 1.25 mg vasotec IV push, 5 mg amlodipine, and 500 ml bolus.

## 2017-04-02 NOTE — PROGRESS NOTES
Received PACU report and assumed care of patient upon arrival to [facility] in T [id number].  Assessment complete; discussed POC with patient.  AO x4, patient calls for assistance.  Patient appears calm and exhibits no signs of distress.  Patient reports pain of 5/10, medicating per MAR PRN.  Patient tolerating current diet, mild nausea, will advance as tolerated.  BS normoactive x 4, LBM PTA, patient voids ambulating to bathroom PRN.  Pt has ambulated 3 times since return from PACU.  Pt noted urination is not painful.  Patient is self ambulating with no assist, gait steady.  Call light within reach, bed in lowest position, SCDs in use, bed alarm refused.  Will continue to monitor pt for changes in status and provide care.

## 2017-04-02 NOTE — H&P
HOSPITAL MEDICINE HISTORY/ PHYSICAL    Date & Time note created:    4/1/2017   9:42 PM       Patient ID:   Name: Devi Caruso. YOB: 1955. Age: 62 y.o. female. MRN: [id number]    Admitting Attending:  Kraig Vázquez     PCP : SHER Guardado    Outpatient urologist: Dr. Carmine Crawford        Chief Complaint:       Flank pain from nephrolithiasis    History of Present Illness:    Zuly is a 62 y.o. female w/h/o epistaxis, hypertension, diabetes, kidney stones who presents with flank pain from nephrolithiasis. Patient initially figured out that she had a stone in 2015. She was seeing urologist Dr. Carmine Crawford. At that time she was scheduled to have a procedure to have the stone removed. However, patient got really sick at that time and had to cancel the procedure. She then forgot about the procedure and was not able to reschedule it.  She was doing okay and then past 18 months since but then started having flank pain about one day ago. At 10 AM she started having pain with urination. She did not have any burning. Around noon she started having flank pain. She tried several things such as ibuprofen and cranberry juice but these did not affect the pain at all. It is worsened by time and nothing really made it better.    Review of Systems:    Has constipation and diarrhea couple days ago but then it resolved yesterday. Patient did have some sweating from her pain.  Please see HPI, all other systems were reviewed and are negative (AMA/CMS criteria)              Past Medical/ Family / Social history (PFSH):   Past Medical History   Diagnosis Date   • Epistaxis 10/30/2013     6 bloody in last few days Not common for patient Lot of sinus trouble in the last week   • Unspecified hypothyroidism 10/30/2013   • Dyslipidemia 10/30/2013   • HTN (hypertension) 10/30/2013     Losartan 100 Doesn't check at home   • Diabetes (CMS-HCC)      oral meds   • Renal disorder      kidney stones     Past Surgical History    Procedure Laterality Date   • Cataract extraction with iol  2015   • Thyroidectomy  1985   • Mass excision general  7/14/2016     Procedure: MASS EXCISION GENERAL SUBCUTANEOUS NECK;  Surgeon: Bhavik Bowen M.D.;  [facility];  Service:      Current Outpatient Medications:  No current facility-administered medications on file prior to encounter.     Current Outpatient Prescriptions on File Prior to Encounter   Medication Sig Dispense Refill   • glipiZIDE (GLUCOTROL) 5 MG Tab TAKE ONE TABLET BY MOUTH TWICE DAILY 180 Tab 0   • losartan (COZAAR) 100 MG Tab Take 1 Tab by mouth every day. 30 Tab 5   • amlodipine (NORVASC) 5 MG Tab Take 1 Tab by mouth every day. 30 Tab 5   • atorvastatin (LIPITOR) 20 MG Tab TAKE ONE TABLET BY MOUTH ONCE DAILY FOR CHOLESTEROL 30 Tab 5   • levothyroxine (SYNTHROID) 150 MCG Tab Take 150 mcg by mouth every evening.     • metformin (GLUCOPHAGE) 1000 MG tablet Take 1 Tab by mouth 2 times a day, with meals. 180 Tab 5   • Multiple Vitamins-Minerals (MULTIVITAL PO) Take 1 Tab by mouth every day.       Medication Allergy/Sensitivities:  No Known Allergies  Family History:  Family History   Problem Relation Age of Onset   • Hypertension Mother    • Diabetes Father    • Hypertension Father    • Diabetes Maternal Grandmother    • Heart Disease Maternal Grandmother      CABG  x 5    • Hypertension Maternal Grandmother    • Hypertension Maternal Grandfather    • Hypertension Paternal Grandmother    • Hypertension Paternal Grandfather    • Cancer Neg Hx    • Hyperlipidemia Neg Hx    • Stroke Neg Hx     mother was shot  Father was also shot father also had Alzheimer's and MI    Social History:  Social History   Substance Use Topics   • Smoking status: Former Smoker -- 0.25 packs/day for 2 years     Types: Cigarettes     Quit date: 01/01/2002   • Smokeless tobacco: Never Used   • Alcohol Use: No     #################################################################  Physical Exam:  "  Vitals/ General Appearance:   Weight/BMI: Body mass index is 37.02 kg/(m^2).  Blood pressure 158/90, pulse 89, temperature 36.5 °C (97.7 °F), resp. rate 18, height 1.676 m (5' 6\"), weight 104 kg (229 lb 4.5 oz), SpO2 95 %.   Filed Vitals:    04/01/17 1807 04/01/17 1817 04/01/17 2104   BP: 158/90     Pulse: 94  89   Temp: 36.5 °C (97.7 °F)     Resp: 18     Height: 1.676 m (5' 6\")     Weight:  104 kg (229 lb 4.5 oz)    SpO2: 98%  95%    Oxygen Therapy:  Pulse Oximetry: 95 %    Constitutional:  well developed, obese  HENMT: Normocephalic, atraumatic, b/l ears normal, nose normal  Eyes:  EOMI, conjunctiva normal, no discharge  Neck: no tracheal deviation, supple  Cardiovascular: normal heart rate, normal rhythm, no murmurs, no rubs or gallops; no cyanosis, clubbing. Trace pitting edema bilateral lower extremities  Lungs: Respiratory effort is normal, normal breath sounds, breath sounds clear to auscultation b/l, no rales, rhonchi or wheezing  Abdomen: soft, mild tenderness to palpation, no guarding or rebound  Skin: warm, dry, no erythema, no rash  Neurologic: Alert and oriented  Psychiatric: Some anxiety or depression    #################################################################  Lab Data Review:    Objective  Recent Results (from the past 24 hour(s))   URINALYSIS,CULTURE IF INDICATED    Collection Time: 04/01/17  7:26 PM   Result Value Ref Range    Micro Urine Req Microscopic     Color Lt. Yellow     Character Sl Cloudy (A)     Specific Gravity 1.016 <1.035    Ph 5.5 5.0-8.0    Glucose Negative Negative mg/dL    Ketones Negative Negative mg/dL    Protein Negative Negative mg/dL    Bilirubin Negative Negative    Nitrite Negative Negative    Leukocyte Esterase Large (A) Negative    Occult Blood Small (A) Negative    Culture Indicated Yes UA Culture   URINE DRUG SCREEN    Collection Time: 04/01/17  7:26 PM   Result Value Ref Range    Amphetamines Urine Negative Negative    Barbiturates Negative Negative    " Benzodiazepines Negative Negative    Cocaine Metabolite Negative Negative    Methadone Negative Negative    Ecstasy Negative Negative    Opiates Negative Negative    Oxycodone Negative Negative    Phencyclidine -Pcp Negative Negative    Propoxyphene Negative Negative    Cannabinoid Metab Negative Negative   URINE MICROSCOPIC (W/UA)    Collection Time: 04/01/17  7:26 PM   Result Value Ref Range    WBC >150 (A) /hpf    RBC 2-5 (A) /hpf    Bacteria Moderate (A) None /hpf    Epithelial Cells Few /hpf    Mucous Threads Moderate /hpf   CBC WITH DIFFERENTIAL    Collection Time: 04/01/17  8:28 PM   Result Value Ref Range    WBC 16.5 (H) 4.8 - 10.8 K/uL    RBC 5.08 4.20 - 5.40 M/uL    Hemoglobin 15.1 12.0 - 16.0 g/dL    Hematocrit 44.1 37.0 - 47.0 %    MCV 86.8 81.4 - 97.8 fL    MCH 29.7 27.0 - 33.0 pg    MCHC 34.2 33.6 - 35.0 g/dL    RDW 40.3 35.9 - 50.0 fL    Platelet Count 267 164 - 446 K/uL    MPV 10.6 9.0 - 12.9 fL    Neutrophils-Polys 86.20 (H) 44.00 - 72.00 %    Lymphocytes 7.80 (L) 22.00 - 41.00 %    Monocytes 4.90 0.00 - 13.40 %    Eosinophils 0.30 0.00 - 6.90 %    Basophils 0.40 0.00 - 1.80 %    Immature Granulocytes 0.40 0.00 - 0.90 %    Nucleated RBC 0.00 /100 WBC    Neutrophils (Absolute) 14.22 (H) 2.00 - 7.15 K/uL    Lymphs (Absolute) 1.29 1.00 - 4.80 K/uL    Monos (Absolute) 0.81 0.00 - 0.85 K/uL    Eos (Absolute) 0.05 0.00 - 0.51 K/uL    Baso (Absolute) 0.07 0.00 - 0.12 K/uL    Immature Granulocytes (abs) 0.07 0.00 - 0.11 K/uL    NRBC (Absolute) 0.00 K/uL   BASIC METABOLIC PANEL    Collection Time: 04/01/17  8:28 PM   Result Value Ref Range    Sodium 140 135 - 145 mmol/L    Potassium 4.0 3.6 - 5.5 mmol/L    Chloride 104 96 - 112 mmol/L    Co2 23 20 - 33 mmol/L    Glucose 151 (H) 65 - 99 mg/dL    Bun 14 8 - 22 mg/dL    Creatinine 1.01 0.50 - 1.40 mg/dL    Calcium 10.1 8.5 - 10.5 mg/dL    Anion Gap 13.0 (H) 0.0 - 11.9   ESTIMATED GFR    Collection Time: 04/01/17  8:28 PM   Result Value Ref Range    GFR If  African American >60 >60 mL/min/1.73 m 2    GFR If Non  56 (A) >60 mL/min/1.73 m 2       (click the triangle to expand results)  My interpretation of lab results:   Glucose 151, anion gap 13, WBC 16.5, UA positive greater than 150 WBCs and moderate bacteria,  Imaging/Procedures Review:    CT-ABDOMEN-PELVIS W/O    (Results Pending)    per report patient has right nephrolithiasis obstructing with right hydronephrosis    Assessment and Plan:      1. Nephrolithiasis, right  - Strain all urine  - Urologist Reyes consulted  - Nothing by mouth for possible stent in morning  2. Right hydronephrosis  - Unilateral with no creatinine changes  - Gentle with IV fluids  - Likely due to nephrolithiasis  3. Type II diabetes with hyperglycemia  - Hold metformin and glipizide while inpatient, discussed with patient  - Sided scale insulin  4. Hypothyroidism  - Continue levothyroxine  5. UTI  - With abnormal urinalysis  - Ceftriaxone  - Urine culture pending  6. Prophylaxis: SCDs  7. Code: Full code per patient  8. Dispo: She will be admitted to inpatient for management that is expected to take greater than 2 midnights

## 2017-04-02 NOTE — ED PROVIDER NOTES
ED Provider Note    Scribed for Brendan Hernandez M.D. by Dafne Redmond. 4/1/2017,  7:35 PM.    CHIEF COMPLAINT  Chief Complaint   Patient presents with   • Painful Urination     voiding small aounts of urine   • Flank Pain     rt       HPI  Devi Caruso is a 62 y.o. female who presents to the Emergency Department for dysuria and right side flank pain  onset approximately 10 hours ago. Patient states sudden onset sharp pain while urinating this morning. She describes flank pain is intermittent and migrates to front of abdomen. Patient reports she took Aleve this morning and ibuprofen approximately 5 hours ago. Patient does not report fever or vomiting. She reports history of kidney stones and was scheduled for surgery 2015 but cancelled it. Patient reports history of diabetes. Pain is colicky, sharp, intermittent, located in the right flank with some radiation to the right lower quadrant.    REVIEW OF SYSTEMS  See HPI for further details. All other systems are negative.     PAST MEDICAL HISTORY   has a past medical history of Epistaxis (10/30/2013); Unspecified hypothyroidism (10/30/2013); Dyslipidemia (10/30/2013); HTN (hypertension) (10/30/2013); Diabetes (CMS-[facility]); and Renal disorder.    SOCIAL HISTORY  Social History     Social History Main Topics   • Smoking status: Former Smoker -- 0.25 packs/day for 2 years     Types: Cigarettes     Quit date: 01/01/2002   • Smokeless tobacco: Never Used   • Alcohol Use: No   • Drug Use: No   • Sexual Activity:     Partners: Male      Comment:  x 42 years     History   Drug Use No       SURGICAL HISTORY   has past surgical history that includes cataract extraction with iol (2015); thyroidectomy (1985); and mass excision general (7/14/2016).    CURRENT MEDICATIONS  Home Medications     Reviewed by Honey Bell on 04/01/17 at 6001  Med List Status: Partial    Medication Last Dose Status    amlodipine (NORVASC) 5 MG Tab 4/1/2017 Active    aspirin (ASA) 81  "MG Chew Tab chewable tablet 3/31/2017 Active    atorvastatin (LIPITOR) 20 MG Tab 4/1/2017 Active    glipiZIDE (GLUCOTROL) 5 MG Tab 4/1/2017 Active    levothyroxine (SYNTHROID) 150 MCG Tab 3/31/2017 Active    losartan (COZAAR) 100 MG Tab 4/1/2017 Active    metformin (GLUCOPHAGE) 1000 MG tablet 4/1/2017 Active    Multiple Vitamins-Minerals (MULTIVITAL PO) 8/5/2016 Active                ALLERGIES  No Known Allergies    PHYSICAL EXAM  VITAL SIGNS: /90 mmHg  Pulse 94  Temp(Src) 36.5 °C (97.7 °F)  Resp 18  Ht 1.676 m (5' 6\")  Wt 104 kg (229 lb 4.5 oz)  BMI 37.02 kg/m2  SpO2 98%  Pulse ox interpretation: I interpret this pulse ox as normal.  Constitutional: Alert in no apparent distress.  HENT: No signs of trauma, Bilateral external ears normal, Nose normal.   Eyes:  Conjunctiva normal, Non-icteric.   Neck: Normal range of motion, No tenderness, Supple, No stridor.   Lymphatic: No lymphadenopathy noted.   Cardiovascular: Regular rate and rhythm, no murmurs.   Thorax & Lungs: Normal breath sounds, No respiratory distress, No wheezing, No chest tenderness.   Abdomen: Bowel sounds normal, Soft, No masses, no tenderness No pulsatile masses. No peritoneal signs.  Skin: Warm, Dry, No erythema, No rash.   Back: moderate right sided CVA tenderness, No midline bony tenderness  Extremities: Intact distal pulses, No edema, No tenderness, No cyanosis.  Musculoskeletal: Good range of motion in all major joints. No tenderness to palpation or major deformities noted.   Neurologic: Alert , Normal motor function, Normal sensory function, No focal deficits noted.   Psychiatric: Affect normal, Judgment normal, Mood normal.     DIAGNOSTIC STUDIES / PROCEDURES    LABS  Labs Reviewed   URINALYSIS,CULTURE IF INDICATED - Abnormal; Notable for the following:     Character Sl Cloudy (*)     Leukocyte Esterase Large (*)     Occult Blood Small (*)     All other components within normal limits   CBC WITH DIFFERENTIAL - Abnormal; Notable " for the following:     WBC 16.5 (*)     Neutrophils-Polys 86.20 (*)     Lymphocytes 7.80 (*)     Neutrophils (Absolute) 14.22 (*)     All other components within normal limits   BASIC METABOLIC PANEL - Abnormal; Notable for the following:     Glucose 151 (*)     Anion Gap 13.0 (*)     All other components within normal limits   URINE MICROSCOPIC (W/UA) - Abnormal; Notable for the following:     WBC >150 (*)     RBC 2-5 (*)     Bacteria Moderate (*)     All other components within normal limits   ESTIMATED GFR - Abnormal; Notable for the following:     GFR If Non  56 (*)     All other components within normal limits   ACCU-CHEK GLUCOSE - Abnormal; Notable for the following:     Glucose - Accu-Ck 181 (*)     All other components within normal limits   URINE DRUG SCREEN   URINE CULTURE(NEW)   RENAL FUNCTION PANEL   CBC WITH DIFFERENTIAL   [facility] POC GLUCOSE   [facility] POC GLUCOSE   [facility] POC GLUCOSE   [facility] POC GLUCOSE   [facility] POC GLUCOSE   [facility] POC GLUCOSE   [facility] POC GLUCOSE   [facility] POC GLUCOSE   [facility] POC GLUCOSE     All labs reviewed by me.    RADIOLOGY  CT-ABDOMEN-PELVIS W/O   Preliminary Result      1. High-grade right-sided hydronephrosis, secondary to a large UPJ stone.      2. No other acute or significant findings.      Report called to Dr. Hernandez in the E.R. on 4/1/2017.      INTERPRETING LOCATION: [address] [place] [address], [facility]        The radiologist's interpretation of all radiological studies have been reviewed by me.    COURSE & MEDICAL DECISION MAKING  Nursing notes, VS, PMSFHx reviewed in chart.     7:35 PM Patient seen and examined at bedside. Differential diagnosis includes but is not limited to UTI perionephrotits, kidney stones, very unlikely to be AAA since the patient has history of kidney stones Ordered for Urinalysis, Urine Drug Screen, CBC with differential, BMP to evaluate. Patient will be treated with Pyridium 100 mg for her symptoms.     8:11 PM 2  "grams ceftriaxone ordered for UA results, indicating either pyelonephritis or infected stone.     8:36 PM Dr. Fernández, radiology, reports 1.5cm right UPJ stone with \"4+ out of 4+ hydro.\" This patient will need to be admitted due to her diabetes and large infected stone. I've paged urology and internal medicine.     8:43 PM I discussed the patient's case and the above findings with Dr. Chambers (urology) who agrees to see patient tomorrow morning after admission. I will initiate antibiotics due to the patient's infected kidney stone.    8:49 PM I discussed the patient's case and the above findings with Dr. Vázuqez (hospitalist) who agrees to admit patient.     8:49 PM Recheck: Patient is resting comfortably and reports feeling improved. I updated her on her results and explained that she will be admitted for further care and evaluation. She understands and agrees.    DISPOSITION:  Patient will be admitted to Dr. Vázquez in guarded condition.    FINAL IMPRESSION  1. Nephrolithiasis  2. Hydronephrosis secondary to #1  3. Urinary tract infection     Dafne MCMILLAN (Scribe), am scribing for, and in the presence of, Brendan Hernandez M.D..    Electronically signed by: Dafne Redmond (Scribdanilo), 4/1/2017    Brendan MCMILLAN M.D. personally performed the services described in this documentation, as scribed by Dafne Redmond in my presence, and it is both accurate and complete.    The note accurately reflects work and decisions made by me.  Brendan Hernandez  4/2/2017  1:16 AM        "

## 2017-04-02 NOTE — ED NOTES
Pt amb to triage.  Chief Complaint   Patient presents with   • Painful Urination     voiding small aounts of urine   • Flank Pain     rt     Onset at Noon today.  Hx of kidney stones.  Pt given urine collection supplies. Instructed on clean catch technique.

## 2017-04-02 NOTE — CARE PLAN
Problem: Pain Management  Goal: Pain level will decrease to patient’s comfort goal  Outcome: PROGRESSING AS EXPECTED  Denies pain at this time.     Problem: Urinary Elimination:  Goal: Ability to reestablish a normal urinary elimination pattern will improve  Outcome: PROGRESSING AS EXPECTED  Reminded pt that her urine must be strained for stones. Verbalized understanding

## 2017-04-02 NOTE — CONSULTS
UROLOGY CONSULT (dictated)    Requested by:  Dr garces  Reason:  1.7cm RIGHT UPJ stone, pain, possible UTI    Recommendations:  To OR for stent

## 2017-04-02 NOTE — OP REPORT
DATE OF SERVICE:  04/02/2017    NAME OF OPERATION:  Cystoscopy with right ureteral stent placement.    PREOPERATIVE DIAGNOSIS:  A 1.7 cm right ureteropelvic junction stone, likely   urinary tract infection.    POSTOPERATIVE DIAGNOSES:  A 1.7 cm right ureteropelvic junction stone, likely   urinary tract infection.    PRIMARY SURGEON:  Travis Zuñiga MD.    ANESTHESIOLOGIST:  Kanu Mireles MD.    FINDINGS:  A 24 cm x 6-Mauritian stent placed with excellent drainage.    INDICATIONS:  Briefly, the patient is a 62-year-old woman who presents with   acute onset of right-sided symptoms related to unknown kidney stone on that   side.  She also has had some dysuria and a highly suspect urinalysis is   worrisome for infection.  She is diabetic.  Upon consideration of her options,   she elected to undergo stent placement in the operating room.  Informed   consent was obtained.    OPERATION IN DETAIL:  The patient was taken to the operating room, placed on   the operating table in supine position.  After administration of general   anesthetic, she was placed in lithotomy.  Genitals were prepped and draped   sterilely.  A 21-Mauritian cystoscope was passed in the bladder.  Bladder was   drained mercury, dark orange urine.  The right ureteral orifice was identified   and a 0.035 guidewire was passed under fluoroscopic guidance up to the right   kidney.  A 24 cm x 6-Mauritian ureteral stent was then placed in the usual   manner.  Its proximal J was seen curling at the level of the stone with a   complete coil and its distal J in the bladder.  Inspection of the stent holes   in the bladder demonstrated a brisk efflux of murky urine from the stent.    Patient's bladder was drained, the case concluded.    She will be readmitted the hospitalist for ongoing medical management.       ____________________________________     TRAVIS ZUÑIGA MD MCM / NTS    DD:  04/02/2017 08:02:39  DT:  04/02/2017 08:36:35    D#:  [id number]  Job#:   [id number]

## 2017-04-02 NOTE — OR SURGEON
Immediate Post-Operative Note      PreOp Diagnosis: RIGHT UPJ stone, likely UTI    PostOp Diagnosis: same    Procedure(s):  CYSTOSCOPY STENT PLACEMENT    Surgeon(s):  Shon Chambers M.D.    Anesthesiologist/Type of Anesthesia:  Anesthesiologist: Kanu Mireles M.D./* No anesthesia type entered *    Surgical Staff:  Circulator: Tania Mendoza R.N.  Scrub Person: Jennifer Knox  Radiology Technologist: [place]    Specimen: none    Estimated Blood Loss: none    Findings: murky urine, excellent drainage from stent with placement.  24x6    Complications: none        4/2/2017 7:59 AM Shon Chambers

## 2017-04-03 VITALS
WEIGHT: 231.92 LBS | RESPIRATION RATE: 16 BRPM | HEART RATE: 91 BPM | TEMPERATURE: 98.5 F | DIASTOLIC BLOOD PRESSURE: 72 MMHG | OXYGEN SATURATION: 93 % | HEIGHT: 66 IN | SYSTOLIC BLOOD PRESSURE: 125 MMHG | BODY MASS INDEX: 37.27 KG/M2

## 2017-04-03 LAB
BACTERIA UR CULT: ABNORMAL
BACTERIA UR CULT: ABNORMAL
ERYTHROCYTE [DISTWIDTH] IN BLOOD BY AUTOMATED COUNT: 44.5 FL (ref 35.9–50)
GLUCOSE BLD-MCNC: 142 MG/DL (ref 65–99)
HCT VFR BLD AUTO: 36.1 % (ref 37–47)
HGB BLD-MCNC: 11.4 G/DL (ref 12–16)
MCH RBC QN AUTO: 28.9 PG (ref 27–33)
MCHC RBC AUTO-ENTMCNC: 31.6 G/DL (ref 33.6–35)
MCV RBC AUTO: 91.4 FL (ref 81.4–97.8)
PLATELET # BLD AUTO: 190 K/UL (ref 164–446)
PMV BLD AUTO: 11.3 FL (ref 9–12.9)
RBC # BLD AUTO: 3.95 M/UL (ref 4.2–5.4)
SIGNIFICANT IND 70042: ABNORMAL
SITE SITE: ABNORMAL
SOURCE SOURCE: ABNORMAL
WBC # BLD AUTO: 12.7 K/UL (ref 4.8–10.8)

## 2017-04-03 PROCEDURE — 700111 HCHG RX REV CODE 636 W/ 250 OVERRIDE (IP): Performed by: HOSPITALIST

## 2017-04-03 PROCEDURE — 700105 HCHG RX REV CODE 258: Performed by: HOSPITALIST

## 2017-04-03 PROCEDURE — A9270 NON-COVERED ITEM OR SERVICE: HCPCS | Performed by: HOSPITALIST

## 2017-04-03 PROCEDURE — 99239 HOSP IP/OBS DSCHRG MGMT >30: CPT | Performed by: INTERNAL MEDICINE

## 2017-04-03 PROCEDURE — 36415 COLL VENOUS BLD VENIPUNCTURE: CPT

## 2017-04-03 PROCEDURE — 85027 COMPLETE CBC AUTOMATED: CPT

## 2017-04-03 PROCEDURE — 82962 GLUCOSE BLOOD TEST: CPT

## 2017-04-03 PROCEDURE — 700102 HCHG RX REV CODE 250 W/ 637 OVERRIDE(OP): Performed by: HOSPITALIST

## 2017-04-03 RX ORDER — ATORVASTATIN CALCIUM 20 MG/1
TABLET, FILM COATED ORAL
Qty: 90 TAB | Refills: 0 | Status: SHIPPED | OUTPATIENT
Start: 2017-04-03 | End: 2017-07-08 | Stop reason: SDUPTHER

## 2017-04-03 RX ORDER — ATORVASTATIN CALCIUM 10 MG/1
10 TABLET, FILM COATED ORAL DAILY
Qty: 30 TAB | Refills: 0 | Status: SHIPPED | OUTPATIENT
Start: 2017-04-03 | End: 2018-01-15

## 2017-04-03 RX ORDER — CIPROFLOXACIN 500 MG/1
500 TABLET, FILM COATED ORAL 2 TIMES DAILY
Qty: 18 TAB | Refills: 0 | Status: SHIPPED | OUTPATIENT
Start: 2017-04-03 | End: 2017-04-12

## 2017-04-03 RX ADMIN — LEVOTHYROXINE SODIUM 150 MCG: 50 TABLET ORAL at 05:40

## 2017-04-03 RX ADMIN — STANDARDIZED SENNA CONCENTRATE AND DOCUSATE SODIUM 2 TABLET: 8.6; 5 TABLET, FILM COATED ORAL at 08:06

## 2017-04-03 RX ADMIN — SODIUM CHLORIDE: 9 INJECTION, SOLUTION INTRAVENOUS at 05:40

## 2017-04-03 RX ADMIN — POLYETHYLENE GLYCOL 3350 1 PACKET: 17 POWDER, FOR SOLUTION ORAL at 08:07

## 2017-04-03 RX ADMIN — ATORVASTATIN CALCIUM 10 MG: 10 TABLET, FILM COATED ORAL at 08:06

## 2017-04-03 RX ADMIN — ASPIRIN 81 MG: 81 TABLET, CHEWABLE ORAL at 08:07

## 2017-04-03 RX ADMIN — CEFTRIAXONE 2 G: 2 INJECTION, POWDER, FOR SOLUTION INTRAMUSCULAR; INTRAVENOUS at 08:11

## 2017-04-03 RX ADMIN — LOSARTAN POTASSIUM 100 MG: 50 TABLET, FILM COATED ORAL at 08:07

## 2017-04-03 ASSESSMENT — PAIN SCALES - GENERAL: PAINLEVEL_OUTOF10: 0

## 2017-04-03 NOTE — DISCHARGE SUMMARY
HOSPITAL MEDICINE DISCHARGE SUMMARY    Name: Devi Caruso  MRN: [id number]  : 1955  Admit Date: 2017  Discharge Date: 4/3/2017  Attending Provider: Rusty Payne M.D.  Primary Care Physician: SHER Guardado    DISCHARGE DIAGNOSES, PLAN AND FOLLOW-UP AND HOSPITAL COURSE  Please review Dr. Kraig Vázquez M.D. notes for further details of history of present illness, past medical/social/family histories, allergies and medications. Please review Dr. Chambers consultation notes. Zuly is a 62 y.o. female w/h/o epistaxis, hypertension, diabetes, kidney stones who presents with flank pain from nephrolithiasis. Patient initially figured out that she had a stone in . She was seeing urologist Dr. Carmine Crawford. At that time she was scheduled to have a procedure to have the stone removed. However, patient got really sick at that time and had to cancel the procedure. She then forgot about the procedure and was not able to reschedule it.  She was doing okay and then past 18 months since but then started having flank pain about one day ago. At 10 AM she started having pain with urination. She did not have any burning. Around noon she started having flank pain. She tried several things such as ibuprofen and cranberry juice but these did not affect the pain at all. It is worsened by time and nothing really made it better.    * Right nephrolithiasis  Assessment & Plan  Dr. Chambers consulted. Cystoscopy with right ureteral stent placement was performed . She improved. Follow up Dr. Chambers in 1 week.    Hydronephrosis, right  Assessment & Plan  As above.    Hypothyroidism  Assessment & Plan  Continue Synthroid.    HTN (hypertension)  Assessment & Plan  Stable. Continue losartan, amlodipine.    Klebsiella UTI (urinary tract infection)  Assessment & Plan  Cultures and sensitivities reviewed, Klebsiella sensitive to cipro (JIMMY<1, better than cephalosporins). Will discharge her on Cipro for complicated UTI for  a total course of 10 days.    Hyperlipidemia  Assessment & Plan  Continue statin, this was confirmed and reconciled to be at 10mg PO daily of atorvastatin.    Diabetes mellitus type 2  Continue metformin and glipizide.    Please see discharge diagnoses, plan and follow up above for details of presenting problem and other medical issues    Devi Caruso improved and was deemed ready to be discharged from the hospital as there were no further inpatient needs. Devi Caruso felt comfortable going home. The discharge plan was discussed with Devi Ambreen Caruso, and agreed to it. Devi Caruso was subsequently discharged in improved and stable condition.    DISCHARGE MEDICATIONS:    Devi Caruso   Home Medication Instructions DELVIS:[id number]    Printed on:04/03/17 1034   Medication Information                      amlodipine (NORVASC) 5 MG Tab  Take 1 Tab by mouth every day.             aspirin (ASA) 81 MG Chew Tab chewable tablet  Take 81 mg by mouth every day.             atorvastatin (LIPITOR) 10 MG Tab  Take 1 Tab by mouth every day.             ciprofloxacin (CIPRO) 500 MG Tab  Take 1 Tab by mouth 2 times a day for 9 days.             glipiZIDE (GLUCOTROL) 5 MG Tab  TAKE ONE TABLET BY MOUTH TWICE DAILY             levothyroxine (SYNTHROID) 150 MCG Tab  Take 150 mcg by mouth every evening.             losartan (COZAAR) 100 MG Tab  Take 1 Tab by mouth every day.             metformin (GLUCOPHAGE) 1000 MG tablet  Take 1 Tab by mouth 2 times a day, with meals.             Multiple Vitamins-Minerals (MULTIVITAL PO)  Take 1 Tab by mouth every day.                 DISCHARGE VITALS, LABS and IMAGING  Filed Vitals:    04/02/17 2326 04/03/17 0000 04/03/17 0351 04/03/17 0700   BP: 123/64  118/64 125/72   Pulse: 92  85 91   Temp: 36.9 °C (98.4 °F)  36.9 °C (98.4 °F) 36.9 °C (98.5 °F)   Resp: 18  18 16   Height:       Weight:       SpO2: 89% 95% 92% 93%     Lab Results   Component Value Date/Time    WBC 12.7*  04/03/2017 02:20 AM    RBC 3.95* 04/03/2017 02:20 AM    HEMOGLOBIN 11.4* 04/03/2017 02:20 AM    HEMATOCRIT 36.1* 04/03/2017 02:20 AM    MCV 91.4 04/03/2017 02:20 AM    MCH 28.9 04/03/2017 02:20 AM    MCHC 31.6* 04/03/2017 02:20 AM    MPV 11.3 04/03/2017 02:20 AM    NEUTROPHILS-POLYS 89.00* 04/02/2017 02:14 AM    LYMPHOCYTES 3.60* 04/02/2017 02:14 AM    MONOCYTES 6.60 04/02/2017 02:14 AM    EOSINOPHILS 0.00 04/02/2017 02:14 AM    BASOPHILS 0.40 04/02/2017 02:14 AM      Lab Results   Component Value Date/Time    SODIUM 140 04/02/2017 02:14 AM    POTASSIUM 3.9 04/02/2017 02:14 AM    CHLORIDE 107 04/02/2017 02:14 AM    CO2 21 04/02/2017 02:14 AM    GLUCOSE 170* 04/02/2017 02:14 AM    BUN 12 04/02/2017 02:14 AM    CREATININE 1.02 04/02/2017 02:14 AM      No results found for: PROTHROMBTM, INR     Ct-abdomen-pelvis W/o    4/2/2017 4/1/2017 8:04 PM HISTORY/REASON FOR EXAM:  Right abdominal pain TECHNIQUE/EXAM DESCRIPTION: CT scan of the abdomen and pelvis without contrast. Noncontrast helical scanning was obtained from the diaphragmatic domes through the pubic symphysis. Low dose optimization technique was utilized for this CT exam including automated exposure control and adjustment of the mA and/or kV according to patient size. COMPARISON: None. FINDINGS: The visualized lung bases are unremarkable. The liver, spleen, stomach, and adrenal glands all have a grossly normal noncontrast appearance. There are no opaque gallstones. Pancreas exhibits fatty infiltration but is grossly normal otherwise. The left kidney has a normal appearance. There is high-grade right-sided hydronephrosis, with extensive perinephric stranding consistent with forniceal rupture. This is secondary to a large, approximate 1.5 cm diameter UPJ stone. The bowel and mesentery are unremarkable. There is no ascites or adenopathy. The bladder is nearly empty but contains no opaque stones.     4/2/2017  1. High-grade right-sided hydronephrosis, secondary to  a large UPJ stone. 2. No other acute or significant findings. Report called to Dr. Hernandez in the E.R. on 4/1/2017. INTERPRETING LOCATION: [address] [place] [address], [id number]      Please see discharge diagnoses, plan and follow up above for details of pending tests and postdischarge instructions for the clinic providers and specialists.    Please CC HSER Guardado, Dr. Weaver    For further details on discharge medications, patient education, diet, and activity, please refer to electronic copy of discharge instructions.       TIME SPENT: 35 minutes, with greater than 50% of the time spent on face-to-face encounter, addressing medical issues, coordination of care, counseling, discharge planning, medication reconciliation, and documentation.

## 2017-04-03 NOTE — DISCHARGE INSTRUCTIONS
YOB: 1955   Age: 62 y.o.               Admit Date: 4/1/2017     Discharge Date: 4/3/2017  Attending Doctor:  Kraig Vázquez M.D.                  Allergies:  Review of patient's allergies indicates no known allergies.  Medical History (as on file):   Past Medical History   Diagnosis Date   • Epistaxis 10/30/2013     6 bloody in last few days Not common for patient Lot of sinus trouble in the last week   • Unspecified hypothyroidism 10/30/2013   • Dyslipidemia 10/30/2013   • HTN (hypertension) 10/30/2013     Losartan 100 Doesn't check at home   • Diabetes (CMS-[facility])      oral meds   • Renal disorder      kidney stones     Past Surgical History   Procedure Laterality Date   • Cataract extraction with iol  2015   • Thyroidectomy  1985   • Mass excision general  7/14/2016     Procedure: MASS EXCISION GENERAL SUBCUTANEOUS NECK;  Surgeon: Bhavik Bowen M.D.;  Location: SURGERY [facility];  Service:    • Cystoscopy stent placement Right 4/2/2017     Procedure: CYSTOSCOPY STENT PLACEMENT;  Surgeon: Shon Chambers M.D.;  Location: SURGERY [facility];  Service:        Discharge Instructions  Blood Pressure: 125/72 mmHg  Weight: 105.2 kg (231 lb 14.8 oz)    Discharged to home by car with relative. Discharged via wheelchair, hospital escort: Yes.    Special equipment needed: Not Applicable    Belongings with: Personal    Instructions:    Follow up with SHER Guardado In 1-2 weeks  Follow up with Dr. Chambers, Urology in 1 week.    Be sure to schedule a follow-up appointment with your primary care doctor or any specialists as instructed.     Discharge Plan:   Diet Plan: Discussed  Activity Level: Discussed  Confirmed Follow up Appointment: Patient to Call and Schedule Appointment  Confirmed Symptoms Management: Discussed  Medication Reconciliation Updated: Yes  Influenza Vaccine Indication: Patient Refuses    DIET:  You may eat any foods that you can tolerate.  It is a good idea to eat a high  fiber diet and take in plenty of fluids to prevent constipation.  If you do become constipated you may want to take a mild laxative or take ducolax tablets on a daily basis until your bowel habits are regular.  Constipation can be very uncomfortable, along with straining, after recent surgery.    I understand that a diet low in cholesterol, fat, and sodium is recommended for good health. Unless I have been given specific instructions below for another diet, I accept this instruction as my diet prescription.       Discharge Medication Instructions:    Below are the medications your physician expects you to take upon discharge:    Review all your home medications and newly ordered medications with your doctor and/or pharmacist. Follow medication instructions as directed by your doctor and/or pharmacist.  GENERAL POST-OPERATIVE  PATIENT INSTRUCTIONS      FOLLOW-UP:  Please call your physician/ return to ER if you have any fevers greater than 100.4, drainage from your wound that is not clear or looks infected, persistent bleeding, increasing abdominal pain, problems urinating, or persistent nausea/vomiting.      WOUND CARE INSTRUCTIONS:  Keep a dry clean dressing on the wound if there is drainage. The initial bandage may be removed after 24 hours.  Once the wound has quit draining you may leave it open to air.  If clothing rubs against the wound or causes irritation and the wound is not draining you may cover it with a dry dressing during the daytime.  Try to keep the wound dry and avoid ointments on the wound unless directed to do so.  If the wound becomes bright red and painful or starts to drain infected material that is not clear, please contact your physician immediately.  If the wound is mildly pink and has a thick firm ridge underneath it, this is normal, and is referred to as a healing ridge.  This will resolve over the next 4-6 weeks. No baths, hot tubs, swimming, no submerging incisions, unit til healed.      ACTIVITY:  You are encouraged to cough and deep breath or use your incentive spirometer if you were given one, every 15-30 minutes when awake.  This will help prevent respiratory complications and low grade fevers post-operatively if you had a general anesthetic.  You may want to hug a pillow when coughing and sneezing to add additional support to the surgical area, if you had abdominal or chest surgery, which will decrease pain during these times.  You are encouraged to walk and engage in light activity for the next two weeks.  You should not lift more than 10 pounds during this time frame as it could put you at increased risk for complications.      MEDICATIONS:  Try to take narcotic medications and anti-inflammatory medications, such as tylenol, ibuprofen, naprosyn, etc., with food.  This will minimize stomach upset from the medication.  Should you develop nausea and vomiting from the pain medication, or develop a rash, please discontinue the medication and contact your physician.  You should not drive, make important decisions, or operate machinery when taking narcotic pain medication.    QUESTIONS:  Please feel free to call your physician or the hospital  if you have any questions, and they will be glad to assist you.           Discharge Instructions        Be sure to schedule a follow-up appointment with your primary care doctor or any specialists as instructed.     Discharge Plan:   Diet Plan: Discussed  Activity Level: Discussed  Confirmed Follow up Appointment: Patient to Call and Schedule Appointment  Confirmed Symptoms Management: Discussed  Medication Reconciliation Updated: Yes  Influenza Vaccine Indication: Patient Refuses    I understand that a diet low in cholesterol, fat, and sodium is recommended for good health. Unless I have been given specific instructions below for another diet, I accept this instruction as my diet prescription.   Other diet: regular    Special Instructions:  None    · Is patient discharged on Warfarin / Coumadin?   No     · Is patient Post Blood Transfusion?  No    Depression / Suicide Risk    As you are discharged from this [facility] facility, it is important to learn how to keep safe from harming yourself.    Recognize the warning signs:  · Abrupt changes in personality, positive or negative- including increase in energy   · Giving away possessions  · Change in eating patterns- significant weight changes-  positive or negative  · Change in sleeping patterns- unable to sleep or sleeping all the time   · Unwillingness or inability to communicate  · Depression  · Unusual sadness, discouragement and loneliness  · Talk of wanting to die  · Neglect of personal appearance   · Rebelliousness- reckless behavior  · Withdrawal from people/activities they love  · Confusion- inability to concentrate     If you or a loved one observes any of these behaviors or has concerns about self-harm, here's what you can do:  · Talk about it- your feelings and reasons for harming yourself  · Remove any means that you might use to hurt yourself (examples: pills, rope, extension cords, firearm)  · Get professional help from the community (Mental Health, Substance Abuse, psychological counseling)  · Do not be alone:Call your Safe Contact- someone whom you trust who will be there for you.  · Call your local CRISIS HOTLINE 551-8733 or 278-894-2275  · Call your local Children's Mobile Crisis Response Team [place] (150) 633-0224 or www.[employer]  · Call the toll free National Suicide Prevention Hotlines   · National Suicide Prevention Lifeline 148-165-JZFR (1121)  · National Hope Line Network 800-SUICIDE (596-1230)

## 2017-04-03 NOTE — CARE PLAN
Problem: Pain Management  Goal: Pain level will decrease to patient’s comfort goal  Outcome: PROGRESSING AS EXPECTED  Tylenol administered for h/a. Some soreness R abdomen.    Problem: Urinary Elimination:  Goal: Ability to reestablish a normal urinary elimination pattern will improve  Outcome: PROGRESSING AS EXPECTED  Pt under impression urine would not be strained anymore so voids not saved. Hat placed and pt informed that we will continue to strain her urine.

## 2017-04-03 NOTE — PROGRESS NOTES
Report received, poc discussed, assumed care of pt.   Call light in reach, hourly rounding in place.   Pt gets up self.   Full liq diet.  + void. LBM pta.   No meds today for pain.  No further needs.  Possible home tomorrow if N/V resolves. Last N/V around lunchtime per pt.

## 2017-04-03 NOTE — PROGRESS NOTES
Assumed care of patient at 0700. Patient is alert and oriented, respirations are unlabored and regular, patient ambulated 500ft this morning, up self, +gas, +bowel sounds, no BM, patient tolerating regular diet. We are straining patients urine per MD order. Patient states no needs at this time.

## 2017-04-03 NOTE — PROGRESS NOTES
"Urology Progress Note    Post op Day # 1. Right stent placement.    Overnight Events: None    S: No fevers, chills, nausea or vomiting.  Passing flatus. Feeling much better this AM. Minimal right flank pain.    O:   Blood pressure 125/72, pulse 91, temperature 36.9 °C (98.5 °F), resp. rate 16, height 1.676 m (5' 6\"), weight 105.2 kg (231 lb 14.8 oz), SpO2 93 %, not currently breastfeeding.  Recent Labs      04/01/17 2028 04/02/17 0214   SODIUM  140  140   POTASSIUM  4.0  3.9   CHLORIDE  104  107   CO2  23  21   GLUCOSE  151*  170*   BUN  14  12   CREATININE  1.01  1.02   CALCIUM  10.1  9.2     Recent Labs      04/01/17 2028 04/02/17 0214 04/03/17   0220   WBC  16.5*  17.0*  12.7*   RBC  5.08  4.67  3.95*   HEMOGLOBIN  15.1  13.8  11.4*   HEMATOCRIT  44.1  41.6  36.1*   MCV  86.8  89.1  91.4   MCH  29.7  29.6  28.9   MCHC  34.2  33.2*  31.6*   RDW  40.3  42.1  44.5   PLATELETCT  267  194  190   MPV  10.6  10.9  11.3         Intake/Output Summary (Last 24 hours) at 04/03/17 0921  Last data filed at 04/03/17 0800   Gross per 24 hour   Intake   1460 ml   Output   1250 ml   Net    210 ml       Exam:  Abdomen soft, benign.    Urine: light pink      A/P:    Active Hospital Problems    Diagnosis   • Hydronephrosis, right [N13.30]     Priority: High   • Right nephrolithiasis [N20.0]     Priority: High   • Hyperlipidemia [E78.5]   • UTI (urinary tract infection) [N39.0]   • Hypothyroidism [E03.9]            • HTN (hypertension) [I10]     Controlled on losartan and amlodipine.         Stable.   Ambulate, IS.  Urology ok with DC home when cleared by medicine  F/U in office with Carlos after discharge to schedule surgery  "

## 2017-04-03 NOTE — PROGRESS NOTES
Discharging Patient home per physician order.  Discharged with relative.  Demonstrated understanding of discharge instructions, follow up appointments, home medications, prescriptions, home care for surgical wound, and nursing care instructions for after cystoscopy.  Ambulating without assistance, voiding without difficulty, pain well controlled, tolerating oral medications, oxygen saturation greater than 90% , tolerating diet.   Educational handouts cystoscopy, kidney stones given and discussed.  Verbalized understanding of discharge instructions and educational handouts.  All questions answered.  Belongings with patient at time of discharge.

## 2017-04-20 ENCOUNTER — OFFICE VISIT (OUTPATIENT)
Dept: MEDICAL GROUP | Facility: PHYSICIAN GROUP | Age: 62
End: 2017-04-20
Payer: COMMERCIAL

## 2017-04-20 VITALS
OXYGEN SATURATION: 96 % | BODY MASS INDEX: 37.28 KG/M2 | TEMPERATURE: 98.4 F | RESPIRATION RATE: 16 BRPM | HEIGHT: 66 IN | WEIGHT: 232 LBS | SYSTOLIC BLOOD PRESSURE: 120 MMHG | DIASTOLIC BLOOD PRESSURE: 80 MMHG | HEART RATE: 72 BPM

## 2017-04-20 DIAGNOSIS — I10 ESSENTIAL HYPERTENSION: ICD-10-CM

## 2017-04-20 DIAGNOSIS — E78.5 DYSLIPIDEMIA: ICD-10-CM

## 2017-04-20 DIAGNOSIS — E03.0 CONGENITAL HYPOTHYROIDISM WITH DIFFUSE GOITER: ICD-10-CM

## 2017-04-20 DIAGNOSIS — G47.34 OXYGEN DESATURATION DURING SLEEP: ICD-10-CM

## 2017-04-20 DIAGNOSIS — E11.69 CONTROLLED TYPE 2 DIABETES MELLITUS WITH OTHER SPECIFIED COMPLICATION: ICD-10-CM

## 2017-04-20 DIAGNOSIS — N13.30 HYDRONEPHROSIS, RIGHT: ICD-10-CM

## 2017-04-20 PROBLEM — N39.0 UTI (URINARY TRACT INFECTION): Status: RESOLVED | Noted: 2017-04-01 | Resolved: 2017-04-20

## 2017-04-20 PROCEDURE — 99214 OFFICE O/P EST MOD 30 MIN: CPT | Performed by: NURSE PRACTITIONER

## 2017-04-20 ASSESSMENT — PATIENT HEALTH QUESTIONNAIRE - PHQ9: CLINICAL INTERPRETATION OF PHQ2 SCORE: 0

## 2017-04-20 NOTE — ASSESSMENT & PLAN NOTE
MEDICATIONS:  Glipizide 5 mg/ Metformin     HGBA1C -  6.6 august 2016     Meter?  At home, relion    Do you need refills of lancets, strips NO     L : D take a statin for your cholesterol YES    U:  Last urine micro DUE for test     C: The you have circulation problems no  Pain no     O: Last eye exam current     S: Smoker NON    E: Exercise walking  ACE inhibitor NO arb.

## 2017-04-20 NOTE — ASSESSMENT & PLAN NOTE
Patient still taking levothyroxine 150 mcg daily. Results for JOSEMANUEL KINNEY (MRN [id number]) as of 4/20/2017 09:54   Ref. Range 8/8/2016 06:47   TSH Latest Ref Range: 0.300-3.700 uIU/mL 1.770   Free T-4 Latest Ref Range: 0.53-1.43 ng/dL 1.21

## 2017-04-20 NOTE — ASSESSMENT & PLAN NOTE
Patient takes Lipitor 10 mg one daily. Results for JOSEMANUEL KINNEY (MRN [id number]) as of 4/20/2017 09:54   Ref. Range 8/8/2016 06:47   Cholesterol,Tot Latest Ref Range: 100-199 mg/dL 162   Triglycerides Latest Ref Range: 0-149 mg/dL 235 (H)   HDL Latest Ref Range: >=40 mg/dL 36 (A)   LDL Latest Ref Range: <100 mg/dL 79

## 2017-04-20 NOTE — MR AVS SNAPSHOT
"        Devi Julienarnulfo   2017 9:40 AM   Office Visit   MRN: [id number]    Department:  [facility]   Dept Phone:  [phone]    Description:  Female : 1955   Provider:  SHER Guardado           Reason for Visit     Hospital Follow-up stents      Allergies as of 2017     Allergen Noted Reactions    Morphine 2017   Anaphylaxis, Vomiting    Vomiting, nausea, chills,      You were diagnosed with     Hydronephrosis, right   [007769]       Congenital hypothyroidism with diffuse goiter   [983966]       Dyslipidemia   [169010]       Essential hypertension   [8336295]       Controlled type 2 diabetes mellitus with other specified complication (CMS-[facility])   [6318771]       Oxygen desaturation during sleep   [036636]         Vital Signs     Blood Pressure Pulse Temperature Respirations Height Weight    120/80 mmHg 72 36.9 °C (98.4 °F) 16 1.676 m (5' 6\") 105.235 kg (232 lb)    Body Mass Index Oxygen Saturation Smoking Status             37.46 kg/m2 96% Former Smoker         Basic Information     Date Of Birth Sex Race Ethnicity Preferred Language    1955 Female White Non- English      Your appointments     2017  8:40 AM   Established Patient with SHER Guardado   [address] [address] [phone]           You will be receiving a confirmation call a few days before your appointment from our automated call confirmation system.              Problem List              ICD-10-CM Priority Class Noted - Resolved    Hypothyroidism E03.9   10/30/2013 - Present    Dyslipidemia E78.5   10/30/2013 - Present    HTN (hypertension) I10   10/30/2013 - Present    Obesity, morbid, BMI 40.0-49.9 (CMS-[facility]) E66.01   10/30/2013 - Present    Vitamin D deficiency E55.9   2014 - Present    Diabetes mellitus type II, controlled (CMS-[facility]) E11.9   2014 - Present    Heart valve disorder I38   2015 - Present " "   Calculus of right kidney N20.0   8/4/2015 - Present    Fatty liver K76.0   8/4/2015 - Present    Goiter E04.9   7/14/2016 - Present    Mass of neck R22.1   7/14/2016 - Present    Hydronephrosis, right N13.30 High  4/1/2017 - Present    Right nephrolithiasis N20.0 High  4/1/2017 - Present    Hyperlipidemia E78.5   4/2/2017 - Present    Oxygen desaturation during sleep G47.34   4/20/2017 - Present      Health Maintenance        Date Due Completion Dates    IMM DTaP/Tdap/Td Vaccine (1 - Tdap) 3/22/1975 ---    RETINAL SCREENING 10/1/2015 10/1/2014 (Prv Comp)    Override on 10/1/2014: Previously completed    MAMMOGRAM 3/5/2016 3/5/2015, 1/23/2014 (Declined), 10/30/2011 (Prv Comp)    Override on 1/23/2014: Patient Declined    Override on 10/30/2011: Previously completed (unable to recall [facility] [place])    IMM ZOSTER VACCINE 3/22/2016 ---    IMM PNEUMOCOCCAL 19-64 (ADULT) MEDIUM RISK SERIES (1 of 1 - PPSV23) 9/30/2016 8/5/2016    A1C SCREENING 2/8/2017 8/8/2016, 4/25/2016, 8/4/2015, 2/27/2015, 10/1/2014, 4/24/2014, 11/16/2013    DIABETES MONOFILAMENT / LE EXAM 4/21/2017 4/21/2016, 4/30/2014    FASTING LIPID PROFILE 8/8/2017 8/8/2016, 4/25/2016, 8/4/2015, 2/27/2015, 10/1/2014, 4/24/2014, 10/30/2013    URINE ACR / MICROALBUMIN 8/8/2017 8/8/2016, 4/25/2016, 8/4/2015, 2/27/2015, 10/1/2014, 4/24/2014    PAP SMEAR 2/25/2018 2/25/2015, 10/30/2011 (Prv Comp)    Override on 10/30/2011: Previously completed (Dr Tobias/Nazia Campbell)    SERUM CREATININE 4/2/2018 4/2/2017, 4/1/2017, 7/11/2016, 4/25/2016, 8/4/2015, 2/27/2015, 10/1/2014, 10/30/2013    COLONOSCOPY 10/30/2018 10/30/2008 (Prv Comp)    Override on 10/30/2008: Previously completed (unable to recall where - \"[facility]anca something in [place]\")            Current Immunizations     13-VALENT PCV PREVNAR 8/5/2016    Influenza TIV (IM) 10/30/2013    Influenza Vaccine Quad Inj (Pf) 10/8/2014  4:48 PM      Below and/or attached are the medications your provider expects you to " take. Review all of your home medications and newly ordered medications with your provider and/or pharmacist. Follow medication instructions as directed by your provider and/or pharmacist. Please keep your medication list with you and share with your provider. Update the information when medications are discontinued, doses are changed, or new medications (including over-the-counter products) are added; and carry medication information at all times in the event of emergency situations     Allergies:  MORPHINE - Anaphylaxis,Vomiting               Medications  Valid as of: April 20, 2017 -  4:42 PM    Generic Name Brand Name Tablet Size Instructions for use    AmLODIPine Besylate (Tab) NORVASC 5 MG Take 1 Tab by mouth every day.        Aspirin (Chew Tab) ASA 81 MG Take 81 mg by mouth every day.        Atorvastatin Calcium (Tab) LIPITOR 10 MG Take 1 Tab by mouth every day.        GlipiZIDE (Tab) GLUCOTROL 5 MG TAKE ONE TABLET BY MOUTH TWICE DAILY        Levothyroxine Sodium (Tab) SYNTHROID 150 MCG Take 150 mcg by mouth every evening.        Losartan Potassium (Tab) COZAAR 100 MG Take 1 Tab by mouth every day.        MetFORMIN HCl (Tab) GLUCOPHAGE 1000 MG Take 1 Tab by mouth 2 times a day, with meals.        MetFORMIN HCl (Tab) GLUCOPHAGE 1000 MG         Multiple Vitamins-Minerals   Take 1 Tab by mouth every day.        NON SPECIFIED   opo  G47.34        .                 Medicines prescribed today were sent to:     [facility] [address] [place] [address] - [address] [place] [id number]    Phone: [phone] Fax: [phone]    Open 24 Hours?: No      Medication refill instructions:       If your prescription bottle indicates you have medication refills left, it is not necessary to call your provider’s office. Please contact your pharmacy and they will refill your medication.    If your prescription bottle indicates you do not have any refills left, you may request refills at any  time through one of the following ways: The online Gabstr system (except Urgent Care), by calling your provider’s office, or by asking your pharmacy to contact your provider’s office with a refill request. Medication refills are processed only during regular business hours and may not be available until the next business day. Your provider may request additional information or to have a follow-up visit with you prior to refilling your medication.   *Please Note: Medication refills are assigned a new Rx number when refilled electronically. Your pharmacy may indicate that no refills were authorized even though a new prescription for the same medication is available at the pharmacy. Please request the medicine by name with the pharmacy before contacting your provider for a refill.        Your To Do List     Future Labs/Procedures Complete By Expires    HEMOGLOBIN A1C  As directed 4/20/2018    MICROALBUMIN CREAT RATIO URINE  As directed 4/20/2018      Other Notes About Your Plan     Mammogram ordered  Pap 2/2015  Colonoscopy < 10 years  Flu current  Exercise tolerance- able to walk            Gabstr Access Code: [email or web address]  Expires: 5/20/2017 10:11 AM    Gabstr  A secure, online tool to manage your health information     [employer]’s Gabstr® is a secure, online tool that connects you to your personalized health information from the privacy of your home -- day or night - making it very easy for you to manage your healthcare. Once the activation process is completed, you can even access your medical information using the Gabstr marcos, which is available for free in the Apple Marcos store or Google Play store.     Gabstr provides the following levels of access (as shown below):   My Chart Features   [facility] Doctor [facility]  Specialists [facility]  Urgent  Care Non-[facility]  Primary Care  Doctor   Email your healthcare team securely and privately 24/7 X X X    Manage appointments: schedule your next  appointment; view details of past/upcoming appointments X      Request prescription refills. X      View recent personal medical records, including lab and immunizations X X X X   View health record, including health history, allergies, medications X X X X   Read reports about your outpatient visits, procedures, consult and ER notes X X X X   See your discharge summary, which is a recap of your hospital and/or ER visit that includes your diagnosis, lab results, and care plan. X X       How to register for Cyanto:  1. Go to  [email or web address].  2. Click on the Sign Up Now box, which takes you to the New Member Sign Up page. You will need to provide the following information:  a. Enter your Cyanto Access Code exactly as it appears at the top of this page. (You will not need to use this code after you’ve completed the sign-up process. If you do not sign up before the expiration date, you must request a new code.)   b. Enter your date of birth.   c. Enter your home email address.   d. Click Submit, and follow the next screen’s instructions.  3. Create a Cyanto ID. This will be your Cyanto login ID and cannot be changed, so think of one that is secure and easy to remember.  4. Create a Cyanto password. You can change your password at any time.  5. Enter your Password Reset Question and Answer. This can be used at a later time if you forget your password.   6. Enter your e-mail address. This allows you to receive e-mail notifications when new information is available in Cyanto.  7. Click Sign Up. You can now view your health information.    For assistance activating your Cyanto account, call [phone]

## 2017-04-20 NOTE — ASSESSMENT & PLAN NOTE
Patient was admitted to hospital for kidney stone and temporary stent replacement. During her sleep patterns it was noted that her oxygenation was severely decreased. She was asked to follow-up here. She states that she's never been diagnosed with sleep apnea or any sleep disorder. Patient has a past history of smoking. She states she does not snore as not been told that she snores. We discussed first evaluation which could be a overnight oximetry.

## 2017-04-20 NOTE — ASSESSMENT & PLAN NOTE
Patient hospitalized for kidney stone. Stent placed temporarily. Patient will go back for official surgery to remove stone. Finished antibiotics. Patient is not in pain. Urinating well.

## 2017-04-20 NOTE — ASSESSMENT & PLAN NOTE
Symptoms:  Headache no , Weakness no , Visual loss no , Chest pain no , Dyspnea no , Claudication no Swelling no   Taking medication: norvasc and cozaar  Diet diabetic  Exercise walking.

## 2017-04-20 NOTE — PROGRESS NOTES
Chief Complaint   Patient presents with   • Hospital Follow-up     stents       HISTORY OF PRESENT ILLNESS: Patient is a @ age 62 here  today to discuss:    Interval history:     Hospitalizations  YES - kidney stone and temporary stent replacement planning future surgery.    Injuries NO     Illness  NO         Hydronephrosis, right  Patient hospitalized for kidney stone. Stent placed temporarily. Patient will go back for official surgery to remove stone. Finished antibiotics. Patient is not in pain. Urinating well.     Hypothyroidism  Patient still taking levothyroxine 150 mcg daily. Results for JOSEMANUEL KINNEY (MRN [id number]) as of 4/20/2017 09:54   Ref. Range 8/8/2016 06:47   TSH Latest Ref Range: 0.300-3.700 uIU/mL 1.770   Free T-4 Latest Ref Range: 0.53-1.43 ng/dL 1.21       Dyslipidemia  Patient takes Lipitor 10 mg one daily. Results for JOSEMANUEL KINNEY (MRN [id number]) as of 4/20/2017 09:54   Ref. Range 8/8/2016 06:47   Cholesterol,Tot Latest Ref Range: 100-199 mg/dL 162   Triglycerides Latest Ref Range: 0-149 mg/dL 235 (H)   HDL Latest Ref Range: >=40 mg/dL 36 (A)   LDL Latest Ref Range: <100 mg/dL 79       HTN (hypertension)  Symptoms:  Headache no , Weakness no , Visual loss no , Chest pain no , Dyspnea no , Claudication no Swelling no   Taking medication: norvasc and cozaar  Diet diabetic  Exercise walking.       Diabetes mellitus type II, controlled  MEDICATIONS:  Glipizide 5 mg/ Metformin     HGBA1C -  6.6 august 2016     Meter?  At home, relion    Do you need refills of lancets, strips NO     L : D take a statin for your cholesterol YES    U:  Last urine micro DUE for test     C: The you have circulation problems no  Pain no     O: Last eye exam current     S: Smoker NON    E: Exercise walking  ACE inhibitor NO arb.               Allergies:Morphine    Current Outpatient Prescriptions Ordered in [facility]   Medication Sig Dispense Refill   • metformin (GLUCOPHAGE) 1000 MG tablet      • NON SPECIFIED  opo  G47.34 1 Each 0   • atorvastatin (LIPITOR) 10 MG Tab Take 1 Tab by mouth every day. 30 Tab 0   • glipiZIDE (GLUCOTROL) 5 MG Tab TAKE ONE TABLET BY MOUTH TWICE DAILY 180 Tab 0   • losartan (COZAAR) 100 MG Tab Take 1 Tab by mouth every day. 30 Tab 5   • amlodipine (NORVASC) 5 MG Tab Take 1 Tab by mouth every day. 30 Tab 5   • levothyroxine (SYNTHROID) 150 MCG Tab Take 150 mcg by mouth every evening.     • metformin (GLUCOPHAGE) 1000 MG tablet Take 1 Tab by mouth 2 times a day, with meals. 180 Tab 5   • Multiple Vitamins-Minerals (MULTIVITAL PO) Take 1 Tab by mouth every day.     • aspirin (ASA) 81 MG Chew Tab chewable tablet Take 81 mg by mouth every day.       No current [facility]-ordered facility-administered medications on file.       Past Medical History   Diagnosis Date   • Epistaxis 10/30/2013     6 bloody in last few days Not common for patient Lot of sinus trouble in the last week   • Unspecified hypothyroidism 10/30/2013   • Dyslipidemia 10/30/2013   • HTN (hypertension) 10/30/2013     Losartan 100 Doesn't check at home   • Diabetes (CMS-[facility])      oral meds   • Renal disorder      kidney stones       Social History   Substance Use Topics   • Smoking status: Former Smoker -- 0.25 packs/day for 2 years     Types: Cigarettes     Quit date: 2002   • Smokeless tobacco: Never Used   • Alcohol Use: No       Family Status   Relation Status Death Age   • Mother Alive    • Father     • Brother Alive      Family History   Problem Relation Age of Onset   • Hypertension Mother    • Diabetes Father    • Hypertension Father    • Diabetes Maternal Grandmother    • Heart Disease Maternal Grandmother      CABG  x 5    • Hypertension Maternal Grandmother    • Hypertension Maternal Grandfather    • Hypertension Paternal Grandmother    • Hypertension Paternal Grandfather    • Cancer Neg Hx    • Hyperlipidemia Neg Hx    • Stroke Neg Hx        ROS: As documented in my HPI      Exam:  Blood pressure 120/80, pulse 72,  "temperature 36.9 °C (98.4 °F), resp. rate 16, height 1.676 m (5' 6\"), weight 105.235 kg (232 lb), SpO2 96 %.  General:  Well nourished, well developed female in NAD  Head: Nontender scalp. No lesions  HEENT: Eyes conjunctiva is clear, lids without ptosis, pupils equal round and reactive to light and accommodation.  Ears normal shape and contour, canals are clear bilaterally, TMs with good light reflex and appear normal.  Nasal mucosa pale and edematous with clear rhinorrhea.  Oropharynx benign.  Sinuses (frontal and maxillary) nontender to palpation.  Neck: Supple. Symmetric Thyroid palpated. No bruits  Pulmonary:  Normal effort. No rales, ronchi, or wheezing.  Cardiovascular: Regular rate and rhythm without murmur.   NO CVA tenderness  Abdomen: Soft nontender, normal bowel sounds  Extremities: no clubbing, cyanosis, or edema.  Psych: Alert and oriented x3.  Neurological: No focal deficits    Please note that this dictation was created using voice recognition software. I have made every reasonable attempt to correct obvious errors, but I expect that there are errors of grammar and possibly content that I did not discover before finalizing the note.    Assessment/Plan:  1. Hydronephrosis, right /kidney stone   follow-up surgery with urology    2. Congenital hypothyroidism with diffuse goiter  TSH+FREE T4   3. Dyslipidemia   Current status of condition is chronic and controlled on therapy.     4. Essential hypertension  Current status of condition is chronic and controlled on therapy.     5. Controlled type 2 diabetes mellitus with other specified complication (CMS-HCC)  HEMOGLOBIN A1C    MICROALBUMIN CREAT RATIO URINE   6. Oxygen desaturation during sleep   overnight oximetry ordered patient informed.               "

## 2017-04-28 ENCOUNTER — HOSPITAL ENCOUNTER (OUTPATIENT)
Dept: LAB | Facility: MEDICAL CENTER | Age: 62
End: 2017-04-28
Attending: UROLOGY
Payer: COMMERCIAL

## 2017-04-28 ENCOUNTER — HOSPITAL ENCOUNTER (OUTPATIENT)
Dept: LAB | Facility: MEDICAL CENTER | Age: 62
End: 2017-04-28
Attending: NURSE PRACTITIONER
Payer: COMMERCIAL

## 2017-04-28 DIAGNOSIS — E11.69 CONTROLLED TYPE 2 DIABETES MELLITUS WITH OTHER SPECIFIED COMPLICATION: ICD-10-CM

## 2017-04-28 LAB
ALBUMIN SERPL BCP-MCNC: 4.3 G/DL (ref 3.2–4.9)
ALBUMIN/GLOB SERPL: 1.7 G/DL
ALP SERPL-CCNC: 75 U/L (ref 30–99)
ALT SERPL-CCNC: 22 U/L (ref 2–50)
AMORPH CRY #/AREA URNS HPF: PRESENT /HPF
ANION GAP SERPL CALC-SCNC: 8 MMOL/L (ref 0–11.9)
APPEARANCE UR: ABNORMAL
APTT PPP: 24.7 SEC (ref 24.7–36)
AST SERPL-CCNC: 16 U/L (ref 12–45)
BASOPHILS # BLD AUTO: 0.8 % (ref 0–1.8)
BASOPHILS # BLD: 0.05 K/UL (ref 0–0.12)
BILIRUB SERPL-MCNC: 0.3 MG/DL (ref 0.1–1.5)
BILIRUB UR QL STRIP.AUTO: NEGATIVE
BUN SERPL-MCNC: 17 MG/DL (ref 8–22)
CALCIUM SERPL-MCNC: 10.1 MG/DL (ref 8.5–10.5)
CAOX CRY #/AREA URNS HPF: ABNORMAL /HPF
CHLORIDE SERPL-SCNC: 105 MMOL/L (ref 96–112)
CO2 SERPL-SCNC: 27 MMOL/L (ref 20–33)
COLOR UR: YELLOW
CREAT SERPL-MCNC: 0.8 MG/DL (ref 0.5–1.4)
CREAT UR-MCNC: 84.9 MG/DL
EOSINOPHIL # BLD AUTO: 0.24 K/UL (ref 0–0.51)
EOSINOPHIL NFR BLD: 3.8 % (ref 0–6.9)
EPI CELLS #/AREA URNS HPF: ABNORMAL /HPF
ERYTHROCYTE [DISTWIDTH] IN BLOOD BY AUTOMATED COUNT: 42.9 FL (ref 35.9–50)
EST. AVERAGE GLUCOSE BLD GHB EST-MCNC: 137 MG/DL
GFR SERPL CREATININE-BSD FRML MDRD: >60 ML/MIN/1.73 M 2
GLOBULIN SER CALC-MCNC: 2.6 G/DL (ref 1.9–3.5)
GLUCOSE SERPL-MCNC: 128 MG/DL (ref 65–99)
GLUCOSE UR STRIP.AUTO-MCNC: NEGATIVE MG/DL
HBA1C MFR BLD: 6.4 % (ref 0–5.6)
HCT VFR BLD AUTO: 45.7 % (ref 37–47)
HGB BLD-MCNC: 14.5 G/DL (ref 12–16)
HYALINE CASTS #/AREA URNS LPF: ABNORMAL /LPF
IMM GRANULOCYTES # BLD AUTO: 0.02 K/UL (ref 0–0.11)
IMM GRANULOCYTES NFR BLD AUTO: 0.3 % (ref 0–0.9)
INR PPP: 0.85 (ref 0.87–1.13)
KETONES UR STRIP.AUTO-MCNC: NEGATIVE MG/DL
LEUKOCYTE ESTERASE UR QL STRIP.AUTO: ABNORMAL
LYMPHOCYTES # BLD AUTO: 1.45 K/UL (ref 1–4.8)
LYMPHOCYTES NFR BLD: 23.1 % (ref 22–41)
MCH RBC QN AUTO: 28.8 PG (ref 27–33)
MCHC RBC AUTO-ENTMCNC: 31.7 G/DL (ref 33.6–35)
MCV RBC AUTO: 90.9 FL (ref 81.4–97.8)
MICRO URNS: ABNORMAL
MICROALBUMIN UR-MCNC: 55.6 MG/DL
MICROALBUMIN/CREAT UR: 655 MG/G (ref 0–30)
MONOCYTES # BLD AUTO: 0.55 K/UL (ref 0–0.85)
MONOCYTES NFR BLD AUTO: 8.7 % (ref 0–13.4)
MUCOUS THREADS #/AREA URNS HPF: ABNORMAL /HPF
NEUTROPHILS # BLD AUTO: 3.98 K/UL (ref 2–7.15)
NEUTROPHILS NFR BLD: 63.3 % (ref 44–72)
NITRITE UR QL STRIP.AUTO: NEGATIVE
NRBC # BLD AUTO: 0 K/UL
NRBC BLD AUTO-RTO: 0 /100 WBC
PH UR STRIP.AUTO: 6 [PH]
PLATELET # BLD AUTO: 286 K/UL (ref 164–446)
PMV BLD AUTO: 11.4 FL (ref 9–12.9)
POTASSIUM SERPL-SCNC: 4.3 MMOL/L (ref 3.6–5.5)
PROT SERPL-MCNC: 6.9 G/DL (ref 6–8.2)
PROT UR QL STRIP: 100 MG/DL
PROTHROMBIN TIME: 11.9 SEC (ref 12–14.6)
RBC # BLD AUTO: 5.03 M/UL (ref 4.2–5.4)
RBC # URNS HPF: >150 /HPF
RBC UR QL AUTO: ABNORMAL
SODIUM SERPL-SCNC: 140 MMOL/L (ref 135–145)
SP GR UR STRIP.AUTO: 1.02
T4 FREE SERPL-MCNC: 1.16 NG/DL (ref 0.53–1.43)
TSH SERPL DL<=0.005 MIU/L-ACNC: 0.34 UIU/ML (ref 0.3–3.7)
WBC # BLD AUTO: 6.3 K/UL (ref 4.8–10.8)
WBC #/AREA URNS HPF: ABNORMAL /HPF

## 2017-04-28 PROCEDURE — 85610 PROTHROMBIN TIME: CPT

## 2017-04-28 PROCEDURE — 81001 URINALYSIS AUTO W/SCOPE: CPT

## 2017-04-28 PROCEDURE — 85025 COMPLETE CBC W/AUTO DIFF WBC: CPT

## 2017-04-28 PROCEDURE — 82043 UR ALBUMIN QUANTITATIVE: CPT

## 2017-04-28 PROCEDURE — 82570 ASSAY OF URINE CREATININE: CPT

## 2017-04-28 PROCEDURE — 87086 URINE CULTURE/COLONY COUNT: CPT

## 2017-04-28 PROCEDURE — 84439 ASSAY OF FREE THYROXINE: CPT

## 2017-04-28 PROCEDURE — 80053 COMPREHEN METABOLIC PANEL: CPT

## 2017-04-28 PROCEDURE — 85730 THROMBOPLASTIN TIME PARTIAL: CPT

## 2017-04-28 PROCEDURE — 84443 ASSAY THYROID STIM HORMONE: CPT

## 2017-04-28 PROCEDURE — 36415 COLL VENOUS BLD VENIPUNCTURE: CPT

## 2017-04-28 PROCEDURE — 83036 HEMOGLOBIN GLYCOSYLATED A1C: CPT

## 2017-04-30 LAB
BACTERIA UR CULT: NORMAL
SIGNIFICANT IND 70042: NORMAL
SOURCE SOURCE: NORMAL

## 2017-05-01 ENCOUNTER — HOSPITAL ENCOUNTER (OUTPATIENT)
Dept: RADIOLOGY | Facility: MEDICAL CENTER | Age: 62
End: 2017-05-01
Attending: UROLOGY
Payer: COMMERCIAL

## 2017-05-01 DIAGNOSIS — N20.0 KIDNEY STONE: ICD-10-CM

## 2017-05-01 PROCEDURE — 74000 DX-ABDOMEN-1 VIEW: CPT

## 2017-05-02 ENCOUNTER — TELEPHONE (OUTPATIENT)
Dept: MEDICAL GROUP | Facility: PHYSICIAN GROUP | Age: 62
End: 2017-05-02

## 2017-05-02 NOTE — TELEPHONE ENCOUNTER
Reviewed lab results. Good improvement in blood sugar control. But she does have blood in her urine. Could you please call her and see if she is symptomatic with this hematuria? This may be because of the stent that was placed in her ongoing problems with kidney but just to double check to make sure she is feeling okay.

## 2017-05-04 ENCOUNTER — HOSPITAL ENCOUNTER (OUTPATIENT)
Dept: RADIOLOGY | Facility: MEDICAL CENTER | Age: 62
End: 2017-05-04
Attending: UROLOGY
Payer: COMMERCIAL

## 2017-05-04 DIAGNOSIS — N20.0 CALCULUS OF KIDNEY: ICD-10-CM

## 2017-05-04 PROCEDURE — 74000 DX-ABDOMEN-1 VIEW: CPT

## 2017-05-04 NOTE — TELEPHONE ENCOUNTER
Patient has had surgery on Tuesday to break up the stone, she gets the stent taken out on Friday. She had an xray done today and it appears that the stone is now gone. She is feeling better.

## 2017-05-08 ENCOUNTER — HOSPITAL ENCOUNTER (OUTPATIENT)
Dept: LAB | Facility: MEDICAL CENTER | Age: 62
End: 2017-05-08
Attending: UROLOGY
Payer: COMMERCIAL

## 2017-05-08 LAB
ALBUMIN SERPL BCP-MCNC: 4.1 G/DL (ref 3.2–4.9)
ALBUMIN/GLOB SERPL: 1.6 G/DL
ALP SERPL-CCNC: 79 U/L (ref 30–99)
ALT SERPL-CCNC: 23 U/L (ref 2–50)
ANION GAP SERPL CALC-SCNC: 9 MMOL/L (ref 0–11.9)
AST SERPL-CCNC: 15 U/L (ref 12–45)
BILIRUB SERPL-MCNC: 0.3 MG/DL (ref 0.1–1.5)
BUN SERPL-MCNC: 20 MG/DL (ref 8–22)
CALCIUM SERPL-MCNC: 10.8 MG/DL (ref 8.5–10.5)
CHLORIDE SERPL-SCNC: 107 MMOL/L (ref 96–112)
CO2 SERPL-SCNC: 26 MMOL/L (ref 20–33)
COLLECT DURATION TIME UR: 24 HR
CREAT CL/1.73 SQ M ?TM UR+SERPL-ARVRAT: 81 ML/MIN (ref 88–128)
CREAT SERPL-MCNC: 0.97 MG/DL (ref 0.5–1.4)
CREAT SERPL-MCNC: 0.97 MG/DL (ref 0.5–1.4)
CREAT UR-MCNC: 54.8 MG/DL
GFR SERPL CREATININE-BSD FRML MDRD: 58 ML/MIN/1.73 M 2
GLOBULIN SER CALC-MCNC: 2.5 G/DL (ref 1.9–3.5)
GLUCOSE SERPL-MCNC: 60 MG/DL (ref 65–99)
POTASSIUM SERPL-SCNC: 4.1 MMOL/L (ref 3.6–5.5)
PROT 24H UR-MCNC: 158.6 MG/24 HR (ref 30–150)
PROT 24H UR-MRATE: 6.1 MG/DL (ref 0–15)
PROT SERPL-MCNC: 6.6 G/DL (ref 6–8.2)
PTH-INTACT SERPL-MCNC: 30.5 PG/ML (ref 14–72)
SODIUM SERPL-SCNC: 142 MMOL/L (ref 135–145)
SPECIMEN VOL UR: 2600 ML
URATE SERPL-MCNC: 7.3 MG/DL (ref 1.9–8.2)
URINE CREATININE EXCRETED 1125: 1425 MG/24 HR
UUN 24H UR-MRATE: 9906 MG/24 HR (ref 12000–20000)

## 2017-05-08 PROCEDURE — 82365 CALCULUS SPECTROSCOPY: CPT

## 2017-05-08 PROCEDURE — 83970 ASSAY OF PARATHORMONE: CPT

## 2017-05-08 PROCEDURE — 81050 URINALYSIS VOLUME MEASURE: CPT | Mod: 91

## 2017-05-08 PROCEDURE — 84392 ASSAY OF URINE SULFATE: CPT

## 2017-05-08 PROCEDURE — 80053 COMPREHEN METABOLIC PANEL: CPT

## 2017-05-08 PROCEDURE — 82340 ASSAY OF CALCIUM IN URINE: CPT

## 2017-05-08 PROCEDURE — 84300 ASSAY OF URINE SODIUM: CPT

## 2017-05-08 PROCEDURE — 84133 ASSAY OF URINE POTASSIUM: CPT

## 2017-05-08 PROCEDURE — 84540 ASSAY OF URINE/UREA-N: CPT

## 2017-05-08 PROCEDURE — 82436 ASSAY OF URINE CHLORIDE: CPT

## 2017-05-08 PROCEDURE — 82507 ASSAY OF CITRATE: CPT

## 2017-05-08 PROCEDURE — 82131 AMINO ACIDS SINGLE QUANT: CPT

## 2017-05-08 PROCEDURE — 84550 ASSAY OF BLOOD/URIC ACID: CPT

## 2017-05-08 PROCEDURE — 84156 ASSAY OF PROTEIN URINE: CPT

## 2017-05-08 PROCEDURE — 83986 ASSAY PH BODY FLUID NOS: CPT

## 2017-05-08 PROCEDURE — 84105 ASSAY OF URINE PHOSPHORUS: CPT

## 2017-05-08 PROCEDURE — 84560 ASSAY OF URINE/URIC ACID: CPT

## 2017-05-08 PROCEDURE — 83945 ASSAY OF OXALATE: CPT

## 2017-05-08 PROCEDURE — 82575 CREATININE CLEARANCE TEST: CPT

## 2017-05-08 PROCEDURE — 83735 ASSAY OF MAGNESIUM: CPT

## 2017-05-11 LAB
APPEARANCE STONE: NORMAL
COMPN STONE: NORMAL
NUMBER STONE: NORMAL
SIZE STONE: NORMAL MM
SPECIMEN WT: 159 MG

## 2017-05-12 LAB
COLLECT DURATION TIME SPEC: 24 HRS
CREAT UR-MCNC: 50 MG/DL
CYSTINE 24H UR-MCNC: 0.65 MG/DL
CYSTINE 24H UR-MRATE: 17 MG/D
CYSTINE/CREAT 24H UR-RTO: 54 UMOL/G CRT (ref 12–81)
TOTAL VOLUME 1105: 2600 ML

## 2017-05-13 LAB
CA H2 PHOS DIHYD 24H UR-MRATE: 1.12
CALCIUM 24H UR-MCNC: 8.8 MG/DL
CALCIUM 24H UR-MRATE: 0.38 MG/(24.H)
CALCIUM 24H UR-MRATE: 229 MG/D
CAOX 24H UR-MRATE: 4.62
CHLORIDE 24H UR-SCNC: 52 MMOL/L
CHLORIDE 24H UR-SRATE: 135 MMOL/D (ref 140–250)
CITRATE 24H UR-MCNC: 227 MG/L
CITRATE 24H UR-MRATE: 590 MG/D (ref 320–1240)
COLLECT DURATION TIME SPEC: 24 HRS
CREAT 24H UR-MCNC: 48 MG/DL
CREAT 24H UR-MRATE: 1248 MG/D (ref 500–1400)
EER SUPERSAT PROFILE UR Q2098: ABNORMAL
MAGNESIUM 24H UR-MCNC: 2.5 MG/DL
MAGNESIUM 24H UR-MRATE: 65 MG/D (ref 12–199)
OXALATE 24H UR-MCNC: 15 MG/L
OXALATE 24H UR-MRATE: 39 MG/D (ref 4–31)
PH UR: 6.01 [PH] (ref 5–7.5)
PHOSPHATE 24H UR-MCNC: 42 MG/DL
PHOSPHATE 24H UR-MRATE: 1092 MG/D (ref 400–1300)
POTASSIUM 24H UR-SCNC: 25 MMOL/L
POTASSIUM 24H UR-SRATE: 65 MMOL/D (ref 25–125)
REF LAB TEST RESULTS: NORMAL
SODIUM 24H UR-SCNC: 69 MMOL/L
SODIUM 24H UR-SRATE: 179 MMOL/D (ref 51–286)
SULFATE 24H UR-SRATE: 29 MMOL/D (ref 6–30)
SULFATE UR-SCNC: 11 MMOL/L
TOTAL VOLUME 1105: 2600 ML
URATE 24H UR-MCNC: 24.9 MG/DL
URATE 24H UR-MRATE: 647 MG/D (ref 250–750)

## 2017-06-05 RX ORDER — AMLODIPINE BESYLATE 5 MG/1
TABLET ORAL
Qty: 90 TAB | Refills: 0 | Status: SHIPPED | OUTPATIENT
Start: 2017-06-05 | End: 2017-07-05 | Stop reason: SDUPTHER

## 2017-06-05 RX ORDER — GLIPIZIDE 5 MG/1
TABLET ORAL
Qty: 180 TAB | Refills: 0 | Status: SHIPPED | OUTPATIENT
Start: 2017-06-05 | End: 2017-07-05 | Stop reason: SDUPTHER

## 2017-06-05 RX ORDER — LOSARTAN POTASSIUM 100 MG/1
TABLET ORAL
Qty: 90 TAB | Refills: 0 | Status: SHIPPED | OUTPATIENT
Start: 2017-06-05 | End: 2017-07-05 | Stop reason: SDUPTHER

## 2017-06-05 RX ORDER — LEVOTHYROXINE SODIUM 0.15 MG/1
TABLET ORAL
Qty: 90 TAB | Refills: 0 | Status: SHIPPED | OUTPATIENT
Start: 2017-06-05 | End: 2017-07-05 | Stop reason: SDUPTHER

## 2017-07-05 ENCOUNTER — OFFICE VISIT (OUTPATIENT)
Dept: MEDICAL GROUP | Facility: PHYSICIAN GROUP | Age: 62
End: 2017-07-05
Payer: COMMERCIAL

## 2017-07-05 VITALS
HEART RATE: 81 BPM | BODY MASS INDEX: 36.8 KG/M2 | RESPIRATION RATE: 14 BRPM | DIASTOLIC BLOOD PRESSURE: 80 MMHG | SYSTOLIC BLOOD PRESSURE: 126 MMHG | TEMPERATURE: 98.7 F | WEIGHT: 229 LBS | HEIGHT: 66 IN | OXYGEN SATURATION: 95 %

## 2017-07-05 DIAGNOSIS — E03.0 CONGENITAL HYPOTHYROIDISM WITH DIFFUSE GOITER: ICD-10-CM

## 2017-07-05 DIAGNOSIS — E11.69 CONTROLLED TYPE 2 DIABETES MELLITUS WITH OTHER SPECIFIED COMPLICATION: ICD-10-CM

## 2017-07-05 DIAGNOSIS — N20.0 CALCULUS OF RIGHT KIDNEY: ICD-10-CM

## 2017-07-05 DIAGNOSIS — E55.9 VITAMIN D DEFICIENCY: ICD-10-CM

## 2017-07-05 DIAGNOSIS — I10 ESSENTIAL HYPERTENSION: ICD-10-CM

## 2017-07-05 DIAGNOSIS — G47.34 OXYGEN DESATURATION DURING SLEEP: ICD-10-CM

## 2017-07-05 PROBLEM — N13.30 HYDRONEPHROSIS, RIGHT: Status: RESOLVED | Noted: 2017-04-01 | Resolved: 2017-07-05

## 2017-07-05 PROCEDURE — 99214 OFFICE O/P EST MOD 30 MIN: CPT | Performed by: NURSE PRACTITIONER

## 2017-07-05 RX ORDER — LOSARTAN POTASSIUM 100 MG/1
TABLET ORAL
Qty: 90 TAB | Refills: 3 | Status: SHIPPED | OUTPATIENT
Start: 2017-07-05 | End: 2018-07-17 | Stop reason: SDUPTHER

## 2017-07-05 RX ORDER — TAMSULOSIN HYDROCHLORIDE 0.4 MG/1
CAPSULE ORAL
COMMUNITY
Start: 2017-05-02 | End: 2018-11-15

## 2017-07-05 RX ORDER — LEVOTHYROXINE SODIUM 0.15 MG/1
TABLET ORAL
Qty: 90 TAB | Refills: 3 | Status: SHIPPED | OUTPATIENT
Start: 2017-07-05 | End: 2018-07-17 | Stop reason: SDUPTHER

## 2017-07-05 RX ORDER — ONDANSETRON 4 MG/1
TABLET, FILM COATED ORAL
COMMUNITY
Start: 2017-05-02 | End: 2018-11-15

## 2017-07-05 RX ORDER — HYDROCODONE BITARTRATE AND ACETAMINOPHEN 5; 325 MG/1; MG/1
TABLET ORAL
COMMUNITY
Start: 2017-05-02 | End: 2018-11-15

## 2017-07-05 RX ORDER — GLIPIZIDE 5 MG/1
TABLET ORAL
Qty: 180 TAB | Refills: 3 | Status: SHIPPED | OUTPATIENT
Start: 2017-07-05 | End: 2018-07-17 | Stop reason: SDUPTHER

## 2017-07-05 RX ORDER — AMLODIPINE BESYLATE 5 MG/1
TABLET ORAL
Qty: 90 TAB | Refills: 3 | Status: SHIPPED | OUTPATIENT
Start: 2017-07-05 | End: 2018-07-17 | Stop reason: SDUPTHER

## 2017-07-05 RX ORDER — POLYETHYLENE GLYCOL 3350 17 G/17G
POWDER, FOR SOLUTION ORAL
COMMUNITY
Start: 2017-04-21 | End: 2020-09-24 | Stop reason: SDUPTHER

## 2017-07-05 RX ORDER — CHOLECALCIFEROL (VITAMIN D3) 50 MCG
TABLET ORAL DAILY
COMMUNITY
End: 2018-11-15

## 2017-07-05 NOTE — ASSESSMENT & PLAN NOTE
Normal TSH and Free T4 in April 2017. Patient takes synthroid 150 mcg one daily. No mental fog. No changes with skin or nails.

## 2017-07-05 NOTE — MR AVS SNAPSHOT
"        Devi Julienarnulfo   2017 8:40 AM   Office Visit   MRN: [id number]    Department:  [facility]   Dept Phone:  [phone]    Description:  Female : 1955   Provider:  SHER Guardado           Reason for Visit     Medication Refill           Allergies as of 2017     Allergen Noted Reactions    Morphine 2017   Anaphylaxis, Vomiting    Vomiting, nausea, chills,      You were diagnosed with     Congenital hypothyroidism with diffuse goiter   [672699]       Controlled type 2 diabetes mellitus with other specified complication (CMS-HCC)   [6922107]       Vitamin D deficiency   [0437218]       Calculus of right kidney   [518415]       Essential hypertension   [5311537]       Oxygen desaturation during sleep   [736000]         Vital Signs     Blood Pressure Pulse Temperature Respirations Height Weight    126/80 mmHg 81 37.1 °C (98.7 °F) 14 1.676 m (5' 6\") 103.874 kg (229 lb)    Body Mass Index Oxygen Saturation Smoking Status             36.98 kg/m2 95% Former Smoker         Basic Information     Date Of Birth Sex Race Ethnicity Preferred Language    1955 Female White Non- English      Problem List              ICD-10-CM Priority Class Noted - Resolved    Hypothyroidism E03.9   10/30/2013 - Present    Dyslipidemia E78.5 High  10/30/2013 - Present    HTN (hypertension) I10   10/30/2013 - Present    Obesity, morbid, BMI 40.0-49.9 (CMS-HCC) E66.01   10/30/2013 - Present    Vitamin D deficiency E55.9   2014 - Present    Diabetes mellitus type II, controlled (CMS-HCC) E11.9   2014 - Present    Heart valve disorder I38   2015 - Present    Calculus of right kidney N20.0   2015 - Present    Fatty liver K76.0   2015 - Present    Goiter E04.9   2016 - Present    Right nephrolithiasis N20.0 High  2017 - Present    Hyperlipidemia E78.5   2017 - Present    Oxygen desaturation during sleep G47.34   2017 - Present      Health Maintenance       " "Date Due Completion Dates    IMM DTaP/Tdap/Td Vaccine (1 - Tdap) 3/22/1975 ---    RETINAL SCREENING 10/1/2015 10/1/2014 (Prv Comp)    Override on 10/1/2014: Previously completed    MAMMOGRAM 3/5/2016 3/5/2015, 1/23/2014 (Declined), 10/30/2011 (Prv Comp)    Override on 1/23/2014: Patient Declined    Override on 10/30/2011: Previously completed (unable to recall where - [place])    IMM ZOSTER VACCINE 3/22/2016 ---    IMM PNEUMOCOCCAL 19-64 (ADULT) MEDIUM RISK SERIES (1 of 1 - PPSV23) 9/30/2016 8/5/2016    DIABETES MONOFILAMENT / LE EXAM 4/21/2017 4/21/2016, 4/30/2014    FASTING LIPID PROFILE 8/8/2017 8/8/2016, 4/25/2016, 8/4/2015, 2/27/2015, 10/1/2014, 4/24/2014, 10/30/2013    IMM INFLUENZA (1) 9/1/2017 10/8/2014, 10/30/2013    A1C SCREENING 10/28/2017 4/28/2017, 8/8/2016, 4/25/2016, 8/4/2015, 2/27/2015, 10/1/2014, 4/24/2014, 11/16/2013    PAP SMEAR 2/25/2018 2/25/2015, 10/30/2011 (Prv Comp)    Override on 10/30/2011: Previously completed (Dr Tobias/Nazia Campbell)    URINE ACR / MICROALBUMIN 5/8/2018 5/8/2017, 4/28/2017, 8/8/2016, 4/25/2016, 8/4/2015, 2/27/2015, 10/1/2014, 4/24/2014    SERUM CREATININE 5/8/2018 5/8/2017, 4/28/2017, 4/2/2017, 4/1/2017, 7/11/2016, 4/25/2016, 8/4/2015, 2/27/2015, 10/1/2014, 10/30/2013    COLONOSCOPY 10/30/2018 10/30/2008 (Prv Comp)    Override on 10/30/2008: Previously completed (unable to recall where - \"[facility]anca something in [place]\")            Current Immunizations     13-VALENT PCV PREVNAR 8/5/2016    Influenza TIV (IM) 10/30/2013    Influenza Vaccine Quad Inj (Pf) 10/8/2014  4:48 PM      Below and/or attached are the medications your provider expects you to take. Review all of your home medications and newly ordered medications with your provider and/or pharmacist. Follow medication instructions as directed by your provider and/or pharmacist. Please keep your medication list with you and share with your provider. Update the information when medications are discontinued, doses are " changed, or new medications (including over-the-counter products) are added; and carry medication information at all times in the event of emergency situations     Allergies:  MORPHINE - Anaphylaxis,Vomiting               Medications  Valid as of: July 05, 2017 -  9:11 AM    Generic Name Brand Name Tablet Size Instructions for use    AmLODIPine Besylate (Tab) NORVASC 5 MG TAKE ONE TABLET BY MOUTH ONCE DAILY        Aspirin (Chew Tab) ASA 81 MG Take 81 mg by mouth every day.        Atorvastatin Calcium (Tab) LIPITOR 10 MG Take 1 Tab by mouth every day.        Cholecalciferol (Tab) vitamin D 2000 UNIT Take  by mouth every day.        GlipiZIDE (Tab) GLUCOTROL 5 MG TAKE ONE TABLET BY MOUTH TWICE DAILY        Hydrocodone-Acetaminophen (Tab) NORCO 5-325 MG         Levothyroxine Sodium (Tab) SYNTHROID 150 MCG TAKE ONE TABLET BY MOUTH ONCE DAILY        Losartan Potassium (Tab) COZAAR 100 MG TAKE ONE TABLET BY MOUTH ONCE DAILY        MetFORMIN HCl (Tab) GLUCOPHAGE 1000 MG Take 1 Tab by mouth 2 times a day, with meals.        Multiple Vitamins-Minerals   Take 1 Tab by mouth every day.        NON SPECIFIED   opo  G47.34        NON SPECIFIED   Patient has night time desaturation identified at hospital.  OPO        Ondansetron HCl (Tab) ZOFRAN 4 MG         Polyethylene Glycol 3350 (Powder) MIRALAX          Tamsulosin HCl (Cap) FLOMAX 0.4 MG         .                 Medicines prescribed today were sent to:     [facility] [address] [place] [address] - [facility] [address]    Phone: [phone] Fax: [phone]    Open 24 Hours?: No      Medication refill instructions:       If your prescription bottle indicates you have medication refills left, it is not necessary to call your provider’s office. Please contact your pharmacy and they will refill your medication.    If your prescription bottle indicates you do not have any refills left, you may request refills at any time through one  of the following ways: The online Prezma system (except Urgent Care), by calling your provider’s office, or by asking your pharmacy to contact your provider’s office with a refill request. Medication refills are processed only during regular business hours and may not be available until the next business day. Your provider may request additional information or to have a follow-up visit with you prior to refilling your medication.   *Please Note: Medication refills are assigned a new Rx number when refilled electronically. Your pharmacy may indicate that no refills were authorized even though a new prescription for the same medication is available at the pharmacy. Please request the medicine by name with the pharmacy before contacting your provider for a refill.           Prezma Access Code: Activation code not generated  Current Prezma Status: Active

## 2017-07-05 NOTE — ASSESSMENT & PLAN NOTE
Patient had stone removal with shock, in additions patient had actually breaking up of stone. Patient has follow up September 7. No hematuria or pain is present.

## 2017-07-05 NOTE — PROGRESS NOTES
Chief Complaint   Patient presents with   • Medication Refill       HISTORY OF PRESENT ILLNESS: Patient is a @ age 62 here  today to dis    Interval history:     Hospitalizations yes for kidney stones     Injuries  No     Illness  No         Hypothyroidism  Normal TSH and Free T4 in April 2017. Patient takes synthroid 150 mcg one daily. No mental fog. No changes with skin or nails.     Diabetes mellitus type II, controlled  MEDICATIONS:  Glipizide and Metformin    HGBA1C -  6.4 April 2017     Meter?   Working     Do you need refills of lancets, strips no     L : D take a statin for your cholesterol - Lipitor 10 mg     U:  Last urine micro done    C: The you have circulation problems no  Pain no     O: Last eye exam : Last year     S: Smoker non smoker     E: Exercise: walking   ACE inhibitor: losartan          Calculus of right kidney  Patient had stone removal with shock, in additions patient had actually breaking up of stone. Patient has follow up September 7. No hematuria or pain is present.     HTN (hypertension)  Symptoms:  Headache no , Weakness no , Visual loss no , Chest pain no , Dyspnea no , Claudication no Swelling no   Taking medication yes Losartan and norvasc  Diet : DASH  Exercise: walking.       Oxygen desaturation during sleep  Patient did not receive call for testing.         Allergies:Morphine    Current Outpatient Prescriptions Ordered in [facility]   Medication Sig Dispense Refill   • Cholecalciferol (VITAMIN D) 2000 UNIT Tab Take  by mouth every day.     • glipiZIDE (GLUCOTROL) 5 MG Tab TAKE ONE TABLET BY MOUTH TWICE DAILY 180 Tab 3   • metformin (GLUCOPHAGE) 1000 MG tablet Take 1 Tab by mouth 2 times a day, with meals. 180 Tab 3   • losartan (COZAAR) 100 MG Tab TAKE ONE TABLET BY MOUTH ONCE DAILY 90 Tab 3   • levothyroxine (SYNTHROID) 150 MCG Tab TAKE ONE TABLET BY MOUTH ONCE DAILY 90 Tab 3   • amlodipine (NORVASC) 5 MG Tab TAKE ONE TABLET BY MOUTH ONCE DAILY 90 Tab 3   • NON SPECIFIED Patient has  "night time desaturation identified at hospital.  OPO 1 Each 0   • NON SPECIFIED opo  G47.34 1 Each 0   • hydrocodone-acetaminophen (NORCO) 5-325 MG Tab per tablet      • ondansetron (ZOFRAN) 4 MG Tab tablet      • polyethylene glycol 3350 (MIRALAX) Powder      • tamsulosin (FLOMAX) 0.4 MG capsule      • atorvastatin (LIPITOR) 10 MG Tab Take 1 Tab by mouth every day. 30 Tab 0   • aspirin (ASA) 81 MG Chew Tab chewable tablet Take 81 mg by mouth every day.     • Multiple Vitamins-Minerals (MULTIVITAL PO) Take 1 Tab by mouth every day.       No current [facility]-ordered facility-administered medications on file.       Past Medical History   Diagnosis Date   • Epistaxis 10/30/2013     6 bloody in last few days Not common for patient Lot of sinus trouble in the last week   • Unspecified hypothyroidism 10/30/2013   • Dyslipidemia 10/30/2013   • HTN (hypertension) 10/30/2013     Losartan 100 Doesn't check at home   • Diabetes (CMS-[facility])      oral meds   • Renal disorder      kidney stones       Social History   Substance Use Topics   • Smoking status: Former Smoker -- 0.25 packs/day for 2 years     Types: Cigarettes     Quit date: 2002   • Smokeless tobacco: Never Used   • Alcohol Use: No       Family Status   Relation Status Death Age   • Mother Alive    • Father     • Brother Alive      Family History   Problem Relation Age of Onset   • Hypertension Mother    • Diabetes Father    • Hypertension Father    • Diabetes Maternal Grandmother    • Heart Disease Maternal Grandmother      CABG  x 5    • Hypertension Maternal Grandmother    • Hypertension Maternal Grandfather    • Hypertension Paternal Grandmother    • Hypertension Paternal Grandfather    • Cancer Neg Hx    • Hyperlipidemia Neg Hx    • Stroke Neg Hx        ROS: As documented in my HPI      Exam:  Blood pressure 126/80, pulse 81, temperature 37.1 °C (98.7 °F), resp. rate 14, height 1.676 m (5' 6\"), weight 103.874 kg (229 lb), SpO2 95 %.  General:  Well " nourished, well developed female in NAD  Head: Nontender scalp. No lesions  Neck: Supple. Symmetric Thyroid palpated. No bruits  Pulmonary:  Normal effort. No rales, ronchi, or wheezing.  Cardiovascular: Regular rate and rhythm without murmur.   Abdomen: Soft nontender, normal bowel sounds  Extremities: no clubbing, cyanosis, or edema.  Psych: Alert and oriented x3.  Emotional: Mood normal      Neurological: No focal deficits    Please note that this dictation was created using voice recognition software. I have made every reasonable attempt to correct obvious errors, but I expect that there are errors of grammar and possibly content that I did not discover before finalizing the note.    Assessment/Plan:  1. Congenital hypothyroidism with diffuse goiter  Current status of condition is chronic and controlled on therapy.     2. Controlled type 2 diabetes mellitus with other specified complication (CMS-HCC)  Need to return to original diet and monitoring   3. Vitamin D deficiency   Current status of condition is chronic and controlled   4. Calculus of right kidney   resolved and continues to see nephrology   5. Essential hypertension   Current status of condition is chronic and controlled on therapy.     6. Oxygen desaturation during sleep   OPO ordered.

## 2017-07-05 NOTE — ASSESSMENT & PLAN NOTE
MEDICATIONS:  Glipizide and Metformin    HGBA1C -  6.4 April 2017     Meter?   Working     Do you need refills of lancets, strips no     L : D take a statin for your cholesterol - Lipitor 10 mg     U:  Last urine micro done    C: The you have circulation problems no  Pain no     O: Last eye exam : Last year     S: Smoker non smoker     E: Exercise: walking   ACE inhibitor: losartan

## 2017-07-05 NOTE — ASSESSMENT & PLAN NOTE
Symptoms:  Headache no , Weakness no , Visual loss no , Chest pain no , Dyspnea no , Claudication no Swelling no   Taking medication yes Losartan and norvasc  Diet : DASH  Exercise: walking.

## 2018-01-09 ENCOUNTER — HOSPITAL ENCOUNTER (OUTPATIENT)
Facility: MEDICAL CENTER | Age: 63
End: 2018-01-09
Attending: UROLOGY
Payer: COMMERCIAL

## 2018-01-09 PROCEDURE — 87186 SC STD MICRODIL/AGAR DIL: CPT

## 2018-01-09 PROCEDURE — 87077 CULTURE AEROBIC IDENTIFY: CPT

## 2018-01-09 PROCEDURE — 87086 URINE CULTURE/COLONY COUNT: CPT

## 2018-01-12 LAB
BACTERIA UR CULT: ABNORMAL
BACTERIA UR CULT: ABNORMAL
SIGNIFICANT IND 70042: ABNORMAL
SITE SITE: ABNORMAL
SOURCE SOURCE: ABNORMAL

## 2018-01-13 ENCOUNTER — TELEPHONE (OUTPATIENT)
Dept: MEDICAL GROUP | Facility: PHYSICIAN GROUP | Age: 63
End: 2018-01-13

## 2018-01-13 DIAGNOSIS — E78.5 DYSLIPIDEMIA: ICD-10-CM

## 2018-01-15 RX ORDER — ATORVASTATIN CALCIUM 20 MG/1
20 TABLET, FILM COATED ORAL DAILY
Qty: 90 TAB | Refills: 0 | Status: SHIPPED | OUTPATIENT
Start: 2018-01-15 | End: 2018-04-15 | Stop reason: SDUPTHER

## 2018-01-15 NOTE — TELEPHONE ENCOUNTER
Was the patient seen in the last year in this department? Yes     Does patient have an active prescription for medications requested? No     Received Request Via: Patient      Pt met protocol?: Yes    OV 7/17       Lab Results  Component Value Date/Time   CHOLSTRLTOT 162 08/08/2016 0647   TRIGLYCERIDE 235 (H) 08/08/2016 0647   HDL 36 (A) 08/08/2016 0647   LDL 79 08/08/2016 0647

## 2018-01-30 ENCOUNTER — HOSPITAL ENCOUNTER (OUTPATIENT)
Dept: LAB | Facility: MEDICAL CENTER | Age: 63
End: 2018-01-30
Attending: NURSE PRACTITIONER
Payer: COMMERCIAL

## 2018-01-30 DIAGNOSIS — E78.5 DYSLIPIDEMIA: ICD-10-CM

## 2018-01-30 LAB
CHOLEST SERPL-MCNC: 159 MG/DL (ref 100–199)
HDLC SERPL-MCNC: 36 MG/DL
LDLC SERPL CALC-MCNC: 80 MG/DL
TRIGL SERPL-MCNC: 214 MG/DL (ref 0–149)

## 2018-01-30 PROCEDURE — 36415 COLL VENOUS BLD VENIPUNCTURE: CPT

## 2018-01-30 PROCEDURE — 80061 LIPID PANEL: CPT

## 2018-02-03 ENCOUNTER — APPOINTMENT (OUTPATIENT)
Dept: URGENT CARE | Facility: PHYSICIAN GROUP | Age: 63
End: 2018-02-03
Payer: COMMERCIAL

## 2018-02-03 ENCOUNTER — APPOINTMENT (OUTPATIENT)
Dept: RADIOLOGY | Facility: IMAGING CENTER | Age: 63
End: 2018-02-03
Attending: UROLOGY
Payer: COMMERCIAL

## 2018-02-03 DIAGNOSIS — N20.0 URIC ACID NEPHROLITHIASIS: ICD-10-CM

## 2018-02-03 PROCEDURE — 74018 RADEX ABDOMEN 1 VIEW: CPT | Mod: TC,FY | Performed by: FAMILY MEDICINE

## 2018-02-12 ENCOUNTER — HOSPITAL ENCOUNTER (OUTPATIENT)
Dept: LAB | Facility: MEDICAL CENTER | Age: 63
End: 2018-02-12
Attending: UROLOGY
Payer: COMMERCIAL

## 2018-02-12 PROCEDURE — 87186 SC STD MICRODIL/AGAR DIL: CPT

## 2018-02-12 PROCEDURE — 87086 URINE CULTURE/COLONY COUNT: CPT

## 2018-02-12 PROCEDURE — 87077 CULTURE AEROBIC IDENTIFY: CPT

## 2018-04-16 RX ORDER — ATORVASTATIN CALCIUM 20 MG/1
20 TABLET, FILM COATED ORAL DAILY
Qty: 90 TAB | Refills: 1 | Status: SHIPPED | OUTPATIENT
Start: 2018-04-16 | End: 2018-10-13 | Stop reason: SDUPTHER

## 2018-04-16 NOTE — TELEPHONE ENCOUNTER
Was the patient seen in the last year in this department? Yes     Does patient have an active prescription for medications requested? No     Received Request Via: Pharmacy    Pt met protocol?: Yes     Last OV 09/2017    Lab Results  Component Value Date/Time   CHOLSTRLTOT 159 01/30/2018 0647   TRIGLYCERIDE 214 (H) 01/30/2018 0647   HDL 36 (A) 01/30/2018 0647   LDL 80 01/30/2018 0647

## 2018-04-16 NOTE — TELEPHONE ENCOUNTER
Pt has had OV within the 12 month protocol and lipid panel is current. 6 month supply sent to pharmacy.   Lab Results   Component Value Date/Time    CHOLSTRLTOT 159 01/30/2018 06:47 AM    LDL 80 01/30/2018 06:47 AM    HDL 36 (A) 01/30/2018 06:47 AM    TRIGLYCERIDE 214 (H) 01/30/2018 06:47 AM       Lab Results   Component Value Date/Time    SODIUM 142 05/08/2017 08:30 AM    POTASSIUM 4.1 05/08/2017 08:30 AM    CHLORIDE 107 05/08/2017 08:30 AM    CO2 26 05/08/2017 08:30 AM    GLUCOSE 60 (L) 05/08/2017 08:30 AM    BUN 20 05/08/2017 08:30 AM    CREATININE 0.97 05/08/2017 08:30 AM     Lab Results   Component Value Date/Time    ALKPHOSPHAT 79 05/08/2017 08:30 AM    ASTSGOT 15 05/08/2017 08:30 AM    ALTSGPT 23 05/08/2017 08:30 AM    TBILIRUBIN 0.3 05/08/2017 08:30 AM

## 2018-07-18 RX ORDER — LOSARTAN POTASSIUM 100 MG/1
TABLET ORAL
Qty: 90 TAB | Refills: 0 | Status: SHIPPED | OUTPATIENT
Start: 2018-07-18 | End: 2018-10-13 | Stop reason: SDUPTHER

## 2018-07-18 RX ORDER — GLIPIZIDE 5 MG/1
TABLET ORAL
Qty: 180 TAB | Refills: 0 | Status: SHIPPED | OUTPATIENT
Start: 2018-07-18 | End: 2018-10-13 | Stop reason: SDUPTHER

## 2018-07-18 RX ORDER — LEVOTHYROXINE SODIUM 0.15 MG/1
TABLET ORAL
Qty: 90 TAB | Refills: 0 | Status: SHIPPED | OUTPATIENT
Start: 2018-07-18 | End: 2018-10-13 | Stop reason: SDUPTHER

## 2018-07-18 RX ORDER — AMLODIPINE BESYLATE 5 MG/1
TABLET ORAL
Qty: 90 TAB | Refills: 0 | Status: SHIPPED | OUTPATIENT
Start: 2018-07-18 | End: 2018-10-13 | Stop reason: SDUPTHER

## 2018-07-30 ENCOUNTER — TELEPHONE (OUTPATIENT)
Dept: MEDICAL GROUP | Facility: PHYSICIAN GROUP | Age: 63
End: 2018-07-30

## 2018-07-30 DIAGNOSIS — E11.9 CONTROLLED TYPE 2 DIABETES MELLITUS WITHOUT COMPLICATION, WITHOUT LONG-TERM CURRENT USE OF INSULIN (HCC): ICD-10-CM

## 2018-08-21 ENCOUNTER — OFFICE VISIT (OUTPATIENT)
Dept: MEDICAL GROUP | Facility: PHYSICIAN GROUP | Age: 63
End: 2018-08-21
Payer: COMMERCIAL

## 2018-08-21 ENCOUNTER — HOSPITAL ENCOUNTER (OUTPATIENT)
Facility: MEDICAL CENTER | Age: 63
End: 2018-08-21
Attending: NURSE PRACTITIONER
Payer: COMMERCIAL

## 2018-08-21 ENCOUNTER — HOSPITAL ENCOUNTER (OUTPATIENT)
Dept: LAB | Facility: MEDICAL CENTER | Age: 63
End: 2018-08-21
Attending: NURSE PRACTITIONER
Payer: COMMERCIAL

## 2018-08-21 VITALS
OXYGEN SATURATION: 97 % | WEIGHT: 227 LBS | TEMPERATURE: 99.1 F | HEIGHT: 66 IN | DIASTOLIC BLOOD PRESSURE: 70 MMHG | HEART RATE: 90 BPM | BODY MASS INDEX: 36.48 KG/M2 | RESPIRATION RATE: 16 BRPM | SYSTOLIC BLOOD PRESSURE: 130 MMHG

## 2018-08-21 DIAGNOSIS — Z12.4 SCREENING FOR CERVICAL CANCER: ICD-10-CM

## 2018-08-21 DIAGNOSIS — Z12.39 SCREENING FOR BREAST CANCER: ICD-10-CM

## 2018-08-21 DIAGNOSIS — Z01.419 WELL WOMAN EXAM WITH ROUTINE GYNECOLOGICAL EXAM: ICD-10-CM

## 2018-08-21 DIAGNOSIS — Z23 NEED FOR SHINGLES VACCINE: ICD-10-CM

## 2018-08-21 DIAGNOSIS — E11.9 CONTROLLED TYPE 2 DIABETES MELLITUS WITHOUT COMPLICATION, WITHOUT LONG-TERM CURRENT USE OF INSULIN (HCC): ICD-10-CM

## 2018-08-21 DIAGNOSIS — Z23 NEED FOR TDAP VACCINATION: ICD-10-CM

## 2018-08-21 DIAGNOSIS — Z23 NEED FOR PNEUMOCOCCAL VACCINATION: ICD-10-CM

## 2018-08-21 LAB
CREAT SERPL-MCNC: 0.91 MG/DL (ref 0.5–1.4)
CREAT UR-MCNC: 81 MG/DL
EST. AVERAGE GLUCOSE BLD GHB EST-MCNC: 137 MG/DL
HBA1C MFR BLD: 6.4 % (ref 0–5.6)
MICROALBUMIN UR-MCNC: 0.9 MG/DL
MICROALBUMIN/CREAT UR: 11 MG/G (ref 0–30)

## 2018-08-21 PROCEDURE — 83036 HEMOGLOBIN GLYCOSYLATED A1C: CPT

## 2018-08-21 PROCEDURE — 90472 IMMUNIZATION ADMIN EACH ADD: CPT | Performed by: NURSE PRACTITIONER

## 2018-08-21 PROCEDURE — 90715 TDAP VACCINE 7 YRS/> IM: CPT | Performed by: NURSE PRACTITIONER

## 2018-08-21 PROCEDURE — 99396 PREV VISIT EST AGE 40-64: CPT | Mod: 25 | Performed by: NURSE PRACTITIONER

## 2018-08-21 PROCEDURE — 90750 HZV VACC RECOMBINANT IM: CPT | Performed by: NURSE PRACTITIONER

## 2018-08-21 PROCEDURE — 90732 PPSV23 VACC 2 YRS+ SUBQ/IM: CPT | Performed by: NURSE PRACTITIONER

## 2018-08-21 PROCEDURE — 36415 COLL VENOUS BLD VENIPUNCTURE: CPT

## 2018-08-21 PROCEDURE — 82565 ASSAY OF CREATININE: CPT

## 2018-08-21 PROCEDURE — 87624 HPV HI-RISK TYP POOLED RSLT: CPT

## 2018-08-21 PROCEDURE — 88175 CYTOPATH C/V AUTO FLUID REDO: CPT

## 2018-08-21 PROCEDURE — 82570 ASSAY OF URINE CREATININE: CPT

## 2018-08-21 PROCEDURE — 82043 UR ALBUMIN QUANTITATIVE: CPT

## 2018-08-21 PROCEDURE — 90471 IMMUNIZATION ADMIN: CPT | Performed by: NURSE PRACTITIONER

## 2018-08-21 RX ORDER — ASCORBIC ACID 500 MG
500 TABLET ORAL DAILY
COMMUNITY

## 2018-08-21 ASSESSMENT — PATIENT HEALTH QUESTIONNAIRE - PHQ9: CLINICAL INTERPRETATION OF PHQ2 SCORE: 0

## 2018-08-21 NOTE — ASSESSMENT & PLAN NOTE
Pt here for well woman exam including PAP/HPV. Her last PAP was 2 years ago and it was normal. She denies/has concerns for unusual vaginal discharge, odor, itching, or pain with intercourse. She no longer has menses as she is post menopausal and denies post-menopausal bleeding.  She does not have any concerns with her breasts. She is not up to date with her mammogram--this has been ordered. She does do monthly self breast exams. She was encouraged to continue with monthly SBEs, instructions were given during exam.

## 2018-08-21 NOTE — PATIENT INSTRUCTIONS
Have your labs done today    Mammogram ordered    Will notify you of results and any further actions if needed    Continue with monthly self breast exams    Follow up: in 3 months

## 2018-08-21 NOTE — PROGRESS NOTES
Chief Complaint   Patient presents with   • Gynecologic Exam         This is a 63 y.o.female patient that presents today with the following:well woman exam    Well woman exam with routine gynecological exam  Pt here for well woman exam including PAP/HPV. Her last PAP was 2 years ago and it was normal. She denies/has concerns for unusual vaginal discharge, odor, itching, or pain with intercourse. She no longer has menses as she is post menopausal and denies post-menopausal bleeding.  She does not have any concerns with her breasts. She is not up to date with her mammogram--this has been ordered. She does do monthly self breast exams. She was encouraged to continue with monthly SBEs, instructions were given during exam.           No visits with results within 1 Month(s) from this visit.   Latest known visit with results is:   Hospital Outpatient Visit on 02/12/2018   Component Date Value   • Significant Indicator 02/12/2018 POS*   • Source 02/12/2018 UR    • Site 02/12/2018 Clean Catch    • Urine Culture 02/12/2018 *                    Value:Escherichia coli  >100,000 cfu/mL           clinical course has been stable    Past Medical History:   Diagnosis Date   • Diabetes (CMS-HCC)     oral meds   • Dyslipidemia 10/30/2013   • Epistaxis 10/30/2013    6 bloody in last few days Not common for patient Lot of sinus trouble in the last week   • HTN (hypertension) 10/30/2013    Losartan 100 Doesn't check at home   • Renal disorder     kidney stones   • Unspecified hypothyroidism 10/30/2013       Past Surgical History:   Procedure Laterality Date   • CYSTOSCOPY STENT PLACEMENT Right 4/2/2017    Procedure: CYSTOSCOPY STENT PLACEMENT;  Surgeon: Shon Chambers M.D.;  Location: SURGERY [facility];  Service:    • MASS EXCISION GENERAL  7/14/2016    Procedure: MASS EXCISION GENERAL SUBCUTANEOUS NECK;  Surgeon: Bhavik Bowen M.D.;  Location: SURGERY [facility];  Service:    • CATARACT EXTRACTION WITH IOL  2015   •  THYROIDECTOMY  1985       Family History   Problem Relation Age of Onset   • Hypertension Mother    • Diabetes Father    • Hypertension Father    • Diabetes Maternal Grandmother    • Heart Disease Maternal Grandmother         CABG  x 5    • Hypertension Maternal Grandmother    • Hypertension Maternal Grandfather    • Hypertension Paternal Grandmother    • Hypertension Paternal Grandfather    • Cancer Neg Hx    • Hyperlipidemia Neg Hx    • Stroke Neg Hx        Morphine    Current Outpatient Prescriptions Ordered in [facility]   Medication Sig Dispense Refill   • ascorbic acid (ASCORBIC ACID) 500 MG Tab Take 500 mg by mouth every day.     • levothyroxine (SYNTHROID) 150 MCG Tab TAKE ONE TABLET BY MOUTH ONCE DAILY 90 Tab 0   • amLODIPine (NORVASC) 5 MG Tab TAKE ONE TABLET BY MOUTH ONCE DAILY 90 Tab 0   • metformin (GLUCOPHAGE) 1000 MG tablet TAKE ONE TABLET BY MOUTH TWICE DAILY WITH MEALS 180 Tab 0   • losartan (COZAAR) 100 MG Tab TAKE ONE TABLET BY MOUTH ONCE DAILY 90 Tab 0   • glipiZIDE (GLUCOTROL) 5 MG Tab TAKE ONE TABLET BY MOUTH TWICE DAILY 180 Tab 0   • atorvastatin (LIPITOR) 20 MG Tab Take 1 Tab by mouth every day. 90 Tab 1   • polyethylene glycol 3350 (MIRALAX) Powder      • NON SPECIFIED Patient has night time desaturation identified at hospital.  OPO 1 Each 0   • NON SPECIFIED opo  G47.34 1 Each 0   • aspirin (ASA) 81 MG Chew Tab chewable tablet Take 81 mg by mouth every day.     • Multiple Vitamins-Minerals (MULTIVITAL PO) Take 1 Tab by mouth every day.     • Cholecalciferol (VITAMIN D) 2000 UNIT Tab Take  by mouth every day.     • hydrocodone-acetaminophen (NORCO) 5-325 MG Tab per tablet      • ondansetron (ZOFRAN) 4 MG Tab tablet      • tamsulosin (FLOMAX) 0.4 MG capsule        No current Epic-ordered facility-administered medications on file.        Constitutional ROS: No unexpected change in weight, No weakness, No unexplained fevers, sweats, or chills  Pulmonary ROS: No chronic cough, sputum, or hemoptysis,  "No shortness of breath, No recent change in breathing  Cardiovascular ROS: No chest pain  Breast ROS: No new breast lumps or masses, No severe breast pain, No recent change in shape/color, Performs self breast exam  Musculoskeletal/Extremities ROS: No clubbing, No peripheral edema, No pain, redness or swelling on the joints  Neurologic ROS: Normal development, No seizures, No weakness  Endocrine ROS: positive for T2DM    Physical exam:  /70   Pulse 90   Temp 37.3 °C (99.1 °F)   Resp 16   Ht 1.676 m (5' 6\")   Wt 103 kg (227 lb)   SpO2 97%   BMI 36.64 kg/m²   General Appearance: older female, alert, no distress, obese, well groomed  Skin: Skin color, texture, turgor normal. No rashes or lesions.  Lungs: negative findings: normal respiratory rate and rhythm, lungs clear to auscultation  Heart: negative. RRR without murmur, gallop, or rubs.  No ectopy.  Breast: negative findings: normal contour with no evidence of flattening or dimpling, palpation negative for masses or nodules, no palpable axillary lymphadenopathy  Abdomen: Abdomen soft, non-tender. BS normal. No masses,  No organomegaly  Musculoskeletal: negative findings: no evidence of joint instability, no evidence of muscle atrophy, no deformities present  Neurologic: intact  Pelvic: external genitalia normal. Cervix normal, bimanual exam normal    Medical decision making/discussion: will notify pt of results and any further actions if needed. Mammogram ordered. She is to have labs done today as she is fasting.    Devi was seen today for gynecologic exam.    Diagnoses and all orders for this visit:    Well woman exam with routine gynecological exam  -     THINPREP PAP WITH HPV; Future    Need for Tdap vaccination  -     TDAP VACCINE =>8YO IM    Need for shingles vaccine  -     SHINGLES VACCINE (SHINGRIX)    Need for pneumococcal vaccination  -     PneumoVax PPV23 =>1yo    Screening for cervical cancer  -     MA-SCREEN MAMMO W/CAD-BILAT; Future  -    "  THINPREP PAP WITH HPV; Future    Screening for breast cancer          Please note that this dictation was created using voice recognition software. I have made every reasonable attempt to correct obvious errors, but I expect that there are errors of grammar and possibly content that I did not discover before finalizing the note.

## 2018-08-22 DIAGNOSIS — Z01.419 WELL WOMAN EXAM WITH ROUTINE GYNECOLOGICAL EXAM: ICD-10-CM

## 2018-08-22 DIAGNOSIS — Z12.4 SCREENING FOR CERVICAL CANCER: ICD-10-CM

## 2018-08-22 LAB
CYTOLOGY REG CYTOL: NORMAL
HPV HR 12 DNA CVX QL NAA+PROBE: NEGATIVE
HPV16 DNA SPEC QL NAA+PROBE: NEGATIVE
HPV18 DNA SPEC QL NAA+PROBE: NEGATIVE
SPECIMEN SOURCE: NORMAL

## 2018-09-15 ENCOUNTER — HOSPITAL ENCOUNTER (OUTPATIENT)
Dept: RADIOLOGY | Facility: MEDICAL CENTER | Age: 63
End: 2018-09-15
Attending: NURSE PRACTITIONER
Payer: COMMERCIAL

## 2018-09-15 DIAGNOSIS — Z12.31 VISIT FOR SCREENING MAMMOGRAM: ICD-10-CM

## 2018-09-15 PROCEDURE — 77067 SCR MAMMO BI INCL CAD: CPT

## 2018-09-25 ENCOUNTER — TELEPHONE (OUTPATIENT)
Dept: MEDICAL GROUP | Facility: PHYSICIAN GROUP | Age: 63
End: 2018-09-25

## 2018-09-25 NOTE — TELEPHONE ENCOUNTER
----- Message from Devi Caruso sent at 9/25/2018  5:00 AM PDT -----  Regarding: Mammogram  Devi,  I have reviewed the results of you recent mammogram. It is normal, there is no evidence of malignancy. We will have you repeat this in 1 year. If you have any questions or concerns, please do not hesitate to call or send me a message.  Sincerely,  CARINA LoraC

## 2018-09-25 NOTE — TELEPHONE ENCOUNTER
I sent the below info to patient via a [employer] message a week ago--pt still has not read message. Please call pt with message. Thank you.

## 2018-10-13 DIAGNOSIS — I10 ESSENTIAL HYPERTENSION: ICD-10-CM

## 2018-10-13 DIAGNOSIS — E03.0 CONGENITAL HYPOTHYROIDISM WITH DIFFUSE GOITER: ICD-10-CM

## 2018-10-13 DIAGNOSIS — E78.5 DYSLIPIDEMIA: ICD-10-CM

## 2018-10-15 RX ORDER — LEVOTHYROXINE SODIUM 0.15 MG/1
150 TABLET ORAL DAILY
Qty: 90 TAB | Refills: 0 | Status: SHIPPED | OUTPATIENT
Start: 2018-10-15 | End: 2018-11-15 | Stop reason: SDUPTHER

## 2018-10-15 RX ORDER — ATORVASTATIN CALCIUM 20 MG/1
TABLET, FILM COATED ORAL
Qty: 90 TAB | Refills: 0 | Status: SHIPPED | OUTPATIENT
Start: 2018-10-15 | End: 2018-11-15 | Stop reason: SDUPTHER

## 2018-10-15 RX ORDER — GLIPIZIDE 5 MG/1
5 TABLET ORAL 2 TIMES DAILY
Qty: 180 TAB | Refills: 0 | Status: SHIPPED | OUTPATIENT
Start: 2018-10-15 | End: 2018-11-15 | Stop reason: SDUPTHER

## 2018-10-15 RX ORDER — AMLODIPINE BESYLATE 5 MG/1
5 TABLET ORAL DAILY
Qty: 90 TAB | Refills: 0 | Status: SHIPPED | OUTPATIENT
Start: 2018-10-15 | End: 2019-01-19 | Stop reason: SDUPTHER

## 2018-10-15 RX ORDER — LOSARTAN POTASSIUM 100 MG/1
100 TABLET ORAL DAILY
Qty: 90 TAB | Refills: 0 | Status: SHIPPED | OUTPATIENT
Start: 2018-10-15 | End: 2018-11-15 | Stop reason: SDUPTHER

## 2018-10-15 NOTE — TELEPHONE ENCOUNTER
Was the patient seen in the last year in this department? Yes    Does patient have an active prescription for medications requested? No     Received Request Via: Pharmacy      Pt met protocol?: Yes pt last ov 8/2018   BP Readings from Last 1 Encounters:   08/21/18 130/70       Lab Results  Component Value Date/Time   CHOLSTRLTOT 159 01/30/2018 0647   TRIGLYCERIDE 214 (H) 01/30/2018 0647   HDL 36 (A) 01/30/2018 0647   LDL 80 01/30/2018 0647       Lab Results   Component Value Date/Time    HBA1C 6.4 (H) 08/21/2018 10:18 AM      TSH   Date Value Ref Range Status   04/28/2017 0.340 0.300 - 3.700 uIU/mL Final

## 2018-11-15 ENCOUNTER — OFFICE VISIT (OUTPATIENT)
Dept: MEDICAL GROUP | Facility: PHYSICIAN GROUP | Age: 63
End: 2018-11-15
Payer: COMMERCIAL

## 2018-11-15 VITALS
SYSTOLIC BLOOD PRESSURE: 132 MMHG | HEIGHT: 66 IN | RESPIRATION RATE: 15 BRPM | DIASTOLIC BLOOD PRESSURE: 80 MMHG | TEMPERATURE: 97 F | BODY MASS INDEX: 36.87 KG/M2 | WEIGHT: 229.4 LBS | OXYGEN SATURATION: 95 % | HEART RATE: 90 BPM

## 2018-11-15 DIAGNOSIS — E03.9 HYPOTHYROIDISM, UNSPECIFIED TYPE: ICD-10-CM

## 2018-11-15 DIAGNOSIS — I10 ESSENTIAL HYPERTENSION: ICD-10-CM

## 2018-11-15 DIAGNOSIS — E11.9 TYPE 2 DIABETES MELLITUS WITHOUT COMPLICATION, WITHOUT LONG-TERM CURRENT USE OF INSULIN (HCC): ICD-10-CM

## 2018-11-15 PROCEDURE — 99214 OFFICE O/P EST MOD 30 MIN: CPT | Performed by: NURSE PRACTITIONER

## 2018-11-15 RX ORDER — GLIPIZIDE 5 MG/1
5 TABLET ORAL 2 TIMES DAILY
Qty: 180 TAB | Refills: 0 | Status: SHIPPED | OUTPATIENT
Start: 2018-11-15 | End: 2019-04-20 | Stop reason: SDUPTHER

## 2018-11-15 RX ORDER — LEVOTHYROXINE SODIUM 0.15 MG/1
150 TABLET ORAL DAILY
Qty: 90 TAB | Refills: 1 | Status: SHIPPED | OUTPATIENT
Start: 2018-11-15 | End: 2019-07-13 | Stop reason: SDUPTHER

## 2018-11-15 RX ORDER — ATORVASTATIN CALCIUM 20 MG/1
TABLET, FILM COATED ORAL
Qty: 90 TAB | Refills: 1 | Status: SHIPPED | OUTPATIENT
Start: 2018-11-15 | End: 2019-07-13 | Stop reason: SDUPTHER

## 2018-11-15 RX ORDER — LOSARTAN POTASSIUM 100 MG/1
100 TABLET ORAL DAILY
Qty: 90 TAB | Refills: 1 | Status: SHIPPED | OUTPATIENT
Start: 2018-11-15 | End: 2019-07-13 | Stop reason: SDUPTHER

## 2018-11-15 NOTE — PROGRESS NOTES
Chief Complaint   Patient presents with   • Diabetes Mellitus         This is a 63 y.o.female patient that presents today with the following: Diabetes follow-up, visit with diabetic nurse educator    HTN (hypertension)  This is a chronic condition which is well controlled on current regimen/medication(s): losartan 100 mg daily and amlodipine 5 mg daily. BP today is 132/80 and denies s/sx of HTN. Patient is tolerating medication(s) without significant or bothersome side effects.  Patient is continue current regimen for this condition and is not due for labs             Hypothyroidism  Stable and well controlled. On levothyroxine 150 mcg daily. She is due for labs  Before next appt. Denies s/sx of hypothyroidism.    Type 2 diabetes mellitus without complication, without long-term current use of insulin (HCC)  This is chronic and stable, well controlled on metformin and glipizide. A1c is 6.4%. Appropriately on ARB and statin. She was also seen by diabetes RN educator, no changes recommended. She needs refills, this was called in for her. Will do f/u in 6 months with labs      No visits with results within 1 Month(s) from this visit.   Latest known visit with results is:   Hospital Outpatient Visit on 08/21/2018   Component Date Value   • Glycohemoglobin 08/21/2018 6.4*   • Est Avg Glucose 08/21/2018 137    • Creatinine, Urine 08/21/2018 81.00    • Microalbumin, Urine Rand* 08/21/2018 0.9    • Micro Alb Creat Ratio 08/21/2018 11    • Creatinine 08/21/2018 0.91    • GFR If  08/21/2018 >60    • GFR If Non  Ameri* 08/21/2018 >60          clinical course has been stable    Past Medical History:   Diagnosis Date   • Diabetes (HCC)     oral meds   • Dyslipidemia 10/30/2013   • Epistaxis 10/30/2013    6 bloody in last few days Not common for patient Lot of sinus trouble in the last week   • HTN (hypertension) 10/30/2013    Losartan 100 Doesn't check at home   • Renal disorder     kidney stones   •  Unspecified hypothyroidism 10/30/2013       Past Surgical History:   Procedure Laterality Date   • CYSTOSCOPY STENT PLACEMENT Right 4/2/2017    Procedure: CYSTOSCOPY STENT PLACEMENT;  Surgeon: Shon Chambers M.D.;  Location: SURGERY [facility];  Service:    • MASS EXCISION GENERAL  7/14/2016    Procedure: MASS EXCISION GENERAL SUBCUTANEOUS NECK;  Surgeon: Bhavik Bowen M.D.;  Location: SURGERY [facility];  Service:    • CATARACT EXTRACTION WITH IOL  2015   • THYROIDECTOMY  1985       Family History   Problem Relation Age of Onset   • Hypertension Mother    • Diabetes Father    • Hypertension Father    • Diabetes Maternal Grandmother    • Heart Disease Maternal Grandmother         CABG  x 5    • Hypertension Maternal Grandmother    • Hypertension Maternal Grandfather    • Hypertension Paternal Grandmother    • Hypertension Paternal Grandfather    • Cancer Neg Hx    • Hyperlipidemia Neg Hx    • Stroke Neg Hx        Morphine    Current Outpatient Prescriptions Ordered in [facility]   Medication Sig Dispense Refill   • metformin (GLUCOPHAGE) 1000 MG tablet Take 1 Tab by mouth 2 times a day, with meals. 180 Tab 1   • glipiZIDE (GLUCOTROL) 5 MG Tab Take 1 Tab by mouth 2 times a day. 180 Tab 0   • losartan (COZAAR) 100 MG Tab Take 1 Tab by mouth every day. 90 Tab 1   • atorvastatin (LIPITOR) 20 MG Tab TAKE 1 TABLET BY MOUTH ONCE DAILY 90 Tab 1   • levothyroxine (SYNTHROID) 150 MCG Tab Take 1 Tab by mouth every day. 90 Tab 1   • amLODIPine (NORVASC) 5 MG Tab Take 1 Tab by mouth every day. 90 Tab 0   • ascorbic acid (ASCORBIC ACID) 500 MG Tab Take 500 mg by mouth every day.     • aspirin (ASA) 81 MG Chew Tab chewable tablet Take 81 mg by mouth every day.     • Multiple Vitamins-Minerals (MULTIVITAL PO) Take 1 Tab by mouth every day.     • polyethylene glycol 3350 (MIRALAX) Powder      • NON SPECIFIED Patient has night time desaturation identified at hospital.  OPO 1 Each 0   • NON SPECIFIED opo  G47.34 1 Each 0  "    No current [facility]-ordered facility-administered medications on file.        Constitutional ROS: No unexpected change in weight, No weakness, No unexplained fevers, sweats, or chills  Pulmonary ROS: No chronic cough, sputum, or hemoptysis, No shortness of breath, No recent change in breathing  Cardiovascular ROS: No chest pain, No edema, No palpitations, Positive for hypertension   Musculoskeletal/Extremities ROS: No clubbing, No peripheral edema, No pain, redness or swelling on the joints  Neurologic ROS: Normal development, No seizures, No weakness  Endocrine ROS: Positive per HPI    Physical exam:  /80 (BP Location: Right arm, Patient Position: Sitting, BP Cuff Size: Adult)   Pulse 90   Temp 36.1 °C (97 °F) (Temporal)   Resp 15   Ht 1.676 m (5' 6\")   Wt 104.1 kg (229 lb 6.4 oz)   SpO2 95%   BMI 37.03 kg/m²   General Appearance: Older female, alert, no distress, obese, well-groomed  Skin: Skin color, texture, turgor normal. No rashes or lesions.  Lungs: negative findings: normal respiratory rate and rhythm, lungs clear to auscultation  Heart: negative. RRR without murmur, gallop, or rubs.  No ectopy.  Abdomen: Abdomen soft, non-tender. BS normal. No masses,  No organomegaly  Musculoskeletal: negative findings: no evidence of joint instability, strength normal, no deformities present  Neurologic: intact, CN II through XII grossly intact    Medical decision making/discussion: There will be no changes to her diabetes regimen, we will plan on seeing her again in 6 months with labs before visit.  All of her medications have been refilled, she is to take them as prescribed.  She was advised to follow-up with optometrist for annual retinal exam as we were unable to do point-of-care retinal exam due to inadequate screening of her retinas.    Devi was seen today for diabetes mellitus.    Diagnoses and all orders for this visit:    Type 2 diabetes mellitus without complication, without long-term current use " of insulin (HCC)  -     Diabetic Monofilament LE Exam  -     HEMOGLOBIN A1C; Future  -     Lipid Profile; Future  -     metformin (GLUCOPHAGE) 1000 MG tablet; Take 1 Tab by mouth 2 times a day, with meals.  -     glipiZIDE (GLUCOTROL) 5 MG Tab; Take 1 Tab by mouth 2 times a day.  -     Cancel: POCT Retinal Eye Exam    Hypothyroidism, unspecified type  -     TSH WITH REFLEX TO FT4; Future  -     levothyroxine (SYNTHROID) 150 MCG Tab; Take 1 Tab by mouth every day.    Essential hypertension  -     losartan (COZAAR) 100 MG Tab; Take 1 Tab by mouth every day.  -     atorvastatin (LIPITOR) 20 MG Tab; TAKE 1 TABLET BY MOUTH ONCE DAILY          Please note that this dictation was created using voice recognition software. I have made every reasonable attempt to correct obvious errors, but I expect that there are errors of grammar and possibly content that I did not discover before finalizing the note.            RN-CDE Note    Subjective:     Health changes since last visit/interval Hx: None    Medications (including changes made today)  Current Outpatient Prescriptions   Medication Sig Dispense Refill   • metformin (GLUCOPHAGE) 1000 MG tablet Take 1 Tab by mouth 2 times a day, with meals. 180 Tab 1   • glipiZIDE (GLUCOTROL) 5 MG Tab Take 1 Tab by mouth 2 times a day. 180 Tab 0   • losartan (COZAAR) 100 MG Tab Take 1 Tab by mouth every day. 90 Tab 1   • atorvastatin (LIPITOR) 20 MG Tab TAKE 1 TABLET BY MOUTH ONCE DAILY 90 Tab 1   • levothyroxine (SYNTHROID) 150 MCG Tab Take 1 Tab by mouth every day. 90 Tab 1   • amLODIPine (NORVASC) 5 MG Tab Take 1 Tab by mouth every day. 90 Tab 0   • ascorbic acid (ASCORBIC ACID) 500 MG Tab Take 500 mg by mouth every day.     • aspirin (ASA) 81 MG Chew Tab chewable tablet Take 81 mg by mouth every day.     • Multiple Vitamins-Minerals (MULTIVITAL PO) Take 1 Tab by mouth every day.     • polyethylene glycol 3350 (MIRALAX) Powder      • NON SPECIFIED Patient has night time desaturation  "identified at hospital.  OPO 1 Each 0   • NON SPECIFIED opo  G47.34 1 Each 0     No current facility-administered medications for this visit.        Taking daily ASA: Yes  Taking above medications as prescribed: yes  SIDE EFFECTS: Patient denies side effects to medications    Exercise: minimal exercise, one hour walking weekly  Diet: \"healthy\" diet  in general  Patient's body mass index is 37.03 kg/m². Exercise and nutrition counseling were performed at this visit.  Vitals:    11/15/18 0819   BP: 132/80   Pulse: 90   Resp: 15   Temp: 36.1 °C (97 °F)   SpO2: 95%           Health Maintenance:   Health Maintenance Due   Topic Date Due   • IMM HEP B VACCINE (1 of 3 - Risk 3-dose series) [birth year]/1975   • RETINAL SCREENING  10/01/2015   • DIABETES MONOFILAMENT / LE EXAM  04/21/2017   • IMM INFLUENZA (1) 09/01/2018   • IMM ZOSTER VACCINES (2 of 2) 10/16/2018   • COLONOSCOPY  10/30/2018       Immunizations:   PPSV23: Up-to-date  [id number]: Up-to-date  Tdap: Due  Flu: Due  Hep B: Due    DM:   Last A1c:   Lab Results   Component Value Date/Time    HBA1C 6.4 (H) 08/21/2018 10:18 AM      A1C GOAL: < 7    Glucose monitoring frequency: daily  100-150  Hypoglycemic episodes: no    Last Retinal Exam: has an appt [facility]  Daily Foot Exam: Yes   Routine Dental Exams: No    Lab Results   Component Value Date/Time    MALBCRT 11 08/21/2018 10:18 AM    MICROALBUR 0.9 08/21/2018 10:18 AM        ACR Albumin/Creatinine Ratio goal <30     HTN:   Blood pressure goal <140/<80 .   Currently Rx ACE/ARB: Yes    Dyslipidemia:    Lab Results   Component Value Date/Time    CHOLSTRLTOT 159 01/30/2018 06:47 AM    LDL 80 01/30/2018 06:47 AM    HDL 36 (A) 01/30/2018 06:47 AM    TRIGLYCERIDE 214 (H) 01/30/2018 06:47 AM       Lab Results   Component Value Date/Time    SODIUM 142 05/08/2017 08:30 AM    POTASSIUM 4.1 05/08/2017 08:30 AM    CHLORIDE 107 05/08/2017 08:30 AM    CO2 26 05/08/2017 08:30 AM    GLUCOSE 60 (L) 05/08/2017 08:30 AM    BUN 20 " 05/08/2017 08:30 AM    CREATININE 0.91 08/21/2018 10:18 AM     Lab Results   Component Value Date/Time    ALKPHOSPHAT 79 05/08/2017 08:30 AM    ASTSGOT 15 05/08/2017 08:30 AM    ALTSGPT 23 05/08/2017 08:30 AM    TBILIRUBIN 0.3 05/08/2017 08:30 AM        Currently Rx Statin: Yes    She  reports that she quit smoking about 16 years ago. Her smoking use included Cigarettes. She has a 0.50 pack-year smoking history. She has never used smokeless tobacco.    Objective:     Exam:  Monofilament: done   Monofilament testing with a 10 gram force: sensation intact: intact bilaterally  Visual Inspection: Feet without maceration, ulcers, fissures.  Pedal pulses: intact bilaterally    Plan:     Discussed and educated on:   - All medications, side effects and compliance (discussed carefully)  - Annual eye examinations at Ophthalmology  - Foot Care: what to look for when checking feet every day  - Home glucose monitoring emphasized  - Weight control and daily exercise    Recommended medication changes: no changes

## 2018-11-18 NOTE — ASSESSMENT & PLAN NOTE
Stable and well controlled. On levothyroxine 150 mcg daily. She is due for labs  Before next appt. Denies s/sx of hypothyroidism.

## 2018-11-18 NOTE — ASSESSMENT & PLAN NOTE
This is a chronic condition which is well controlled on current regimen/medication(s): losartan 100 mg daily and amlodipine 5 mg daily. BP today is 132/80 and denies s/sx of HTN. Patient is tolerating medication(s) without significant or bothersome side effects.  Patient is continue current regimen for this condition and is not due for labs

## 2018-11-18 NOTE — ASSESSMENT & PLAN NOTE
This is chronic and stable, well controlled on metformin and glipizide. A1c is 6.4%. Appropriately on ARB and statin. She was also seen by diabetes RN educator, no changes recommended. She needs refills, this was called in for her. Will do f/u in 6 months with labs

## 2019-01-21 RX ORDER — AMLODIPINE BESYLATE 5 MG/1
TABLET ORAL
Qty: 90 TAB | Refills: 0 | Status: SHIPPED | OUTPATIENT
Start: 2019-01-21 | End: 2019-04-20 | Stop reason: SDUPTHER

## 2019-01-21 NOTE — TELEPHONE ENCOUNTER
Was the patient seen in the last year in this department? Yes    Does patient have an active prescription for medications requested? Yes    Received Request Via: Pharmacy      Pt met protocol?: Yes, OV 11/18, pt overdue for labs   BP Readings from Last 1 Encounters:   11/15/18 132/80

## 2019-02-09 ENCOUNTER — OFFICE VISIT (OUTPATIENT)
Dept: URGENT CARE | Facility: PHYSICIAN GROUP | Age: 64
End: 2019-02-09
Payer: COMMERCIAL

## 2019-02-09 VITALS
TEMPERATURE: 100.2 F | BODY MASS INDEX: 36.48 KG/M2 | HEART RATE: 115 BPM | OXYGEN SATURATION: 89 % | SYSTOLIC BLOOD PRESSURE: 132 MMHG | WEIGHT: 227 LBS | DIASTOLIC BLOOD PRESSURE: 80 MMHG | RESPIRATION RATE: 16 BRPM | HEIGHT: 66 IN

## 2019-02-09 DIAGNOSIS — J22 LOWER RESPIRATORY INFECTION: ICD-10-CM

## 2019-02-09 PROCEDURE — 99214 OFFICE O/P EST MOD 30 MIN: CPT | Performed by: NURSE PRACTITIONER

## 2019-02-09 RX ORDER — DOXYCYCLINE HYCLATE 100 MG
100 TABLET ORAL 2 TIMES DAILY
Qty: 20 TAB | Refills: 0 | Status: SHIPPED | OUTPATIENT
Start: 2019-02-09 | End: 2019-02-19

## 2019-02-09 RX ORDER — ALBUTEROL SULFATE 90 UG/1
1-2 AEROSOL, METERED RESPIRATORY (INHALATION) EVERY 6 HOURS PRN
Qty: 1 INHALER | Refills: 0 | Status: SHIPPED | OUTPATIENT
Start: 2019-02-09 | End: 2019-09-18

## 2019-02-10 ASSESSMENT — ENCOUNTER SYMPTOMS
SORE THROAT: 0
CONSTIPATION: 0
COUGH: 1
CHILLS: 0
ABDOMINAL PAIN: 0
VOMITING: 0
NAUSEA: 0
SHORTNESS OF BREATH: 0
DIZZINESS: 0
MUSCULOSKELETAL NEGATIVE: 1
BLURRED VISION: 0
PALPITATIONS: 0
WHEEZING: 1
HEADACHES: 0
DOUBLE VISION: 0
STRIDOR: 0
DIARRHEA: 0
FEVER: 0

## 2019-02-10 NOTE — PROGRESS NOTES
Subjective:   Devi Caruso is a 63 y.o. female who presents for Cough        Cough   This is a new problem. The current episode started 1 to 4 weeks ago. The problem has been unchanged. The problem occurs constantly. The cough is productive of sputum. Associated symptoms include nasal congestion and wheezing. Pertinent negatives include no chest pain, chills, ear pain, fever, headaches, rash, sore throat or shortness of breath. She has tried OTC cough suppressant for the symptoms. The treatment provided mild relief. There is no history of asthma.        Review of Systems   Constitutional: Negative for chills and fever.   HENT: Positive for congestion and nosebleeds. Negative for ear discharge, ear pain and sore throat.         Nosebleed in office   Eyes: Negative for blurred vision and double vision.   Respiratory: Positive for cough and wheezing. Negative for shortness of breath and stridor.    Cardiovascular: Negative for chest pain and palpitations.   Gastrointestinal: Negative for abdominal pain, constipation, diarrhea, nausea and vomiting.   Musculoskeletal: Negative.    Skin: Negative.  Negative for itching and rash.   Neurological: Negative for dizziness and headaches.   All other systems reviewed and are negative.    PMH:  has a past medical history of Diabetes (HCC); Dyslipidemia (10/30/2013); Epistaxis (10/30/2013); HTN (hypertension) (10/30/2013); Renal disorder; and Unspecified hypothyroidism (10/30/2013).  MEDS:   Current Outpatient Prescriptions:   •  albuterol 108 (90 Base) MCG/ACT Aero Soln inhalation aerosol, Inhale 1-2 Puffs by mouth every 6 hours as needed for Shortness of Breath., Disp: 1 Inhaler, Rfl: 0  •  doxycycline (VIBRAMYCIN) 100 MG Tab, Take 1 Tab by mouth 2 times a day for 10 days., Disp: 20 Tab, Rfl: 0  •  amLODIPine (NORVASC) 5 MG Tab, TAKE 1 TABLET BY MOUTH ONCE DAILY *NEED  LABS*, Disp: 90 Tab, Rfl: 0  •  metformin (GLUCOPHAGE) 1000 MG tablet, Take 1 Tab by mouth 2 times a day,  with meals., Disp: 180 Tab, Rfl: 1  •  glipiZIDE (GLUCOTROL) 5 MG Tab, Take 1 Tab by mouth 2 times a day., Disp: 180 Tab, Rfl: 0  •  losartan (COZAAR) 100 MG Tab, Take 1 Tab by mouth every day., Disp: 90 Tab, Rfl: 1  •  atorvastatin (LIPITOR) 20 MG Tab, TAKE 1 TABLET BY MOUTH ONCE DAILY, Disp: 90 Tab, Rfl: 1  •  levothyroxine (SYNTHROID) 150 MCG Tab, Take 1 Tab by mouth every day., Disp: 90 Tab, Rfl: 1  •  ascorbic acid (ASCORBIC ACID) 500 MG Tab, Take 500 mg by mouth every day., Disp: , Rfl:   •  polyethylene glycol 3350 (MIRALAX) Powder, , Disp: , Rfl:   •  NON SPECIFIED, Patient has night time desaturation identified at hospital. OPO, Disp: 1 Each, Rfl: 0  •  NON SPECIFIED, opo G47.34, Disp: 1 Each, Rfl: 0  •  aspirin (ASA) 81 MG Chew Tab chewable tablet, Take 81 mg by mouth every day., Disp: , Rfl:   •  Multiple Vitamins-Minerals (MULTIVITAL PO), Take 1 Tab by mouth every day., Disp: , Rfl:   ALLERGIES:   Allergies   Allergen Reactions   • Morphine Anaphylaxis and Vomiting     Vomiting, nausea, chills,     SURGHX:   Past Surgical History:   Procedure Laterality Date   • CYSTOSCOPY STENT PLACEMENT Right 4/2/2017    Procedure: CYSTOSCOPY STENT PLACEMENT;  Surgeon: Shon Chambers M.D.;  Location: SURGERY [facility];  Service:    • MASS EXCISION GENERAL  7/14/2016    Procedure: MASS EXCISION GENERAL SUBCUTANEOUS NECK;  Surgeon: Bhavik Bowen M.D.;  Location: SURGERY [facility];  Service:    • CATARACT EXTRACTION WITH IOL  2015   • THYROIDECTOMY  1985     SOCHX:  reports that she quit smoking about 17 years ago. Her smoking use included Cigarettes. She has a 0.50 pack-year smoking history. She has never used smokeless tobacco. She reports that she does not drink alcohol or use drugs.  FH: Family history was reviewed, no pertinent findings to report     Objective:   /80 (BP Location: Left arm, Patient Position: Sitting, BP Cuff Size: Adult)   Pulse (!) 115   Temp 37.9 °C (100.2 °F) (Temporal)  "  Resp 16   Ht 1.676 m (5' 6\")   Wt 103 kg (227 lb)   SpO2 89%   BMI 36.64 kg/m²   Physical Exam   Constitutional: She is oriented to person, place, and time. She appears well-developed and well-nourished. No distress.   HENT:   Head: Normocephalic.   Right Ear: Hearing and ear canal normal. Tympanic membrane is not erythematous. A middle ear effusion is present.   Left Ear: Hearing and ear canal normal. Tympanic membrane is not erythematous. A middle ear effusion is present.   Nose: No rhinorrhea. Right sinus exhibits no maxillary sinus tenderness and no frontal sinus tenderness. Left sinus exhibits no maxillary sinus tenderness and no frontal sinus tenderness.   Mouth/Throat: Oropharynx is clear and moist and mucous membranes are normal. No posterior oropharyngeal erythema.   Nosebleed controlled in office within 5 minutes.   No area of bleed visible   Eyes: Pupils are equal, round, and reactive to light. Conjunctivae, EOM and lids are normal.   Neck: Normal range of motion. No thyromegaly present.   Cardiovascular: Normal rate, regular rhythm and normal heart sounds.    Pulmonary/Chest: Effort normal. No respiratory distress. She has wheezes in the right upper field.   Lymphadenopathy:        Head (right side): No submandibular and no tonsillar adenopathy present.        Head (left side): No submandibular and no tonsillar adenopathy present.   Neurological: She is alert and oriented to person, place, and time.   Skin: Skin is warm and dry. No rash noted. She is not diaphoretic.   Psychiatric: She has a normal mood and affect. Her behavior is normal. Judgment and thought content normal.   Vitals reviewed.        Assessment/Plan:   Assessment    1. Lower respiratory infection  - albuterol 108 (90 Base) MCG/ACT Aero Soln inhalation aerosol; Inhale 1-2 Puffs by mouth every 6 hours as needed for Shortness of Breath.  Dispense: 1 Inhaler; Refill: 0  - doxycycline (VIBRAMYCIN) 100 MG Tab; Take 1 Tab by mouth 2 " times a day for 10 days.  Dispense: 20 Tab; Refill: 0    We discussed to use humidified air to help prevent nosebleeds as this could be from dryness.  Discussed signs and symptoms of when to go to ER    Differential diagnosis, natural history, supportive care, and indications for immediate follow-up discussed.

## 2019-07-13 DIAGNOSIS — E03.9 HYPOTHYROIDISM, UNSPECIFIED TYPE: ICD-10-CM

## 2019-07-13 DIAGNOSIS — E11.9 TYPE 2 DIABETES MELLITUS WITHOUT COMPLICATION, WITHOUT LONG-TERM CURRENT USE OF INSULIN (HCC): ICD-10-CM

## 2019-07-13 DIAGNOSIS — I10 ESSENTIAL HYPERTENSION: ICD-10-CM

## 2019-07-15 RX ORDER — LOSARTAN POTASSIUM 100 MG/1
TABLET ORAL
Qty: 90 TAB | Refills: 0 | Status: SHIPPED | OUTPATIENT
Start: 2019-07-15 | End: 2019-09-18 | Stop reason: SDUPTHER

## 2019-07-15 RX ORDER — LEVOTHYROXINE SODIUM 0.15 MG/1
TABLET ORAL
Qty: 90 TAB | Refills: 0 | Status: SHIPPED | OUTPATIENT
Start: 2019-07-15 | End: 2019-09-18 | Stop reason: SDUPTHER

## 2019-07-15 RX ORDER — AMLODIPINE BESYLATE 5 MG/1
TABLET ORAL
Qty: 90 TAB | Refills: 0 | Status: SHIPPED | OUTPATIENT
Start: 2019-07-15 | End: 2019-09-18 | Stop reason: SDUPTHER

## 2019-07-15 RX ORDER — GLIPIZIDE 5 MG/1
TABLET ORAL
Qty: 180 TAB | Refills: 0 | Status: SHIPPED | OUTPATIENT
Start: 2019-07-15 | End: 2019-09-18 | Stop reason: SDUPTHER

## 2019-07-15 RX ORDER — ATORVASTATIN CALCIUM 20 MG/1
TABLET, FILM COATED ORAL
Qty: 90 TAB | Refills: 0 | Status: SHIPPED | OUTPATIENT
Start: 2019-07-15 | End: 2019-09-18 | Stop reason: SDUPTHER

## 2019-07-15 NOTE — TELEPHONE ENCOUNTER
*Note on last RX for patient to get labs done*  Was the patient seen in the last year in this department? Yes    Does patient have an active prescription for medications requested? No     Received Request Via: Pharmacy    Pt met protocol?: No     Last OV 11/15/18    BP Readings from Last 1 Encounters:   02/09/19 132/80     Lab Results   Component Value Date/Time    HBA1C 6.4 (H) 08/21/2018 10:18 AM        Lab Results  Component Value Date/Time   CHOLSTRLTOT 159 01/30/2018 0647   TRIGLYCERIDE 214 (H) 01/30/2018 0647   HDL 36 (A) 01/30/2018 0647   LDL 80 01/30/2018 0647       TSH   Date Value Ref Range Status   04/28/2017 0.340 0.300 - 3.700 uIU/mL Final

## 2019-08-27 ENCOUNTER — HOSPITAL ENCOUNTER (OUTPATIENT)
Dept: LAB | Facility: MEDICAL CENTER | Age: 64
End: 2019-08-27
Attending: NURSE PRACTITIONER
Payer: COMMERCIAL

## 2019-08-27 DIAGNOSIS — E03.9 HYPOTHYROIDISM, UNSPECIFIED TYPE: ICD-10-CM

## 2019-08-27 DIAGNOSIS — E11.9 TYPE 2 DIABETES MELLITUS WITHOUT COMPLICATION, WITHOUT LONG-TERM CURRENT USE OF INSULIN (HCC): ICD-10-CM

## 2019-08-27 LAB
CHOLEST SERPL-MCNC: 187 MG/DL (ref 100–199)
FASTING STATUS PATIENT QL REPORTED: NORMAL
HDLC SERPL-MCNC: 43 MG/DL
LDLC SERPL CALC-MCNC: 106 MG/DL
TRIGL SERPL-MCNC: 192 MG/DL (ref 0–149)
TSH SERPL DL<=0.005 MIU/L-ACNC: 0.43 UIU/ML (ref 0.38–5.33)

## 2019-08-27 PROCEDURE — 80061 LIPID PANEL: CPT

## 2019-08-27 PROCEDURE — 83036 HEMOGLOBIN GLYCOSYLATED A1C: CPT

## 2019-08-27 PROCEDURE — 84443 ASSAY THYROID STIM HORMONE: CPT

## 2019-08-27 PROCEDURE — 36415 COLL VENOUS BLD VENIPUNCTURE: CPT

## 2019-08-28 LAB
EST. AVERAGE GLUCOSE BLD GHB EST-MCNC: 151 MG/DL
HBA1C MFR BLD: 6.9 % (ref 0–5.6)

## 2019-09-18 ENCOUNTER — OFFICE VISIT (OUTPATIENT)
Dept: MEDICAL GROUP | Facility: PHYSICIAN GROUP | Age: 64
End: 2019-09-18
Payer: COMMERCIAL

## 2019-09-18 VITALS
SYSTOLIC BLOOD PRESSURE: 122 MMHG | DIASTOLIC BLOOD PRESSURE: 68 MMHG | BODY MASS INDEX: 36 KG/M2 | OXYGEN SATURATION: 97 % | RESPIRATION RATE: 16 BRPM | HEIGHT: 66 IN | TEMPERATURE: 98.4 F | WEIGHT: 224 LBS | HEART RATE: 93 BPM

## 2019-09-18 DIAGNOSIS — E03.9 HYPOTHYROIDISM, UNSPECIFIED TYPE: ICD-10-CM

## 2019-09-18 DIAGNOSIS — Z12.12 SCREENING FOR COLORECTAL CANCER: ICD-10-CM

## 2019-09-18 DIAGNOSIS — Z23 NEED FOR INFLUENZA VACCINATION: ICD-10-CM

## 2019-09-18 DIAGNOSIS — Z82.49 FAMILY HISTORY OF CAROTID ARTERY STENOSIS: ICD-10-CM

## 2019-09-18 DIAGNOSIS — E11.9 TYPE 2 DIABETES MELLITUS WITHOUT COMPLICATION, WITHOUT LONG-TERM CURRENT USE OF INSULIN (HCC): ICD-10-CM

## 2019-09-18 DIAGNOSIS — E78.2 MIXED HYPERLIPIDEMIA: ICD-10-CM

## 2019-09-18 DIAGNOSIS — I10 ESSENTIAL HYPERTENSION: ICD-10-CM

## 2019-09-18 DIAGNOSIS — Z12.11 SCREENING FOR COLORECTAL CANCER: ICD-10-CM

## 2019-09-18 DIAGNOSIS — E66.9 OBESITY (BMI 30-39.9): ICD-10-CM

## 2019-09-18 PROCEDURE — 90686 IIV4 VACC NO PRSV 0.5 ML IM: CPT | Performed by: NURSE PRACTITIONER

## 2019-09-18 PROCEDURE — 99214 OFFICE O/P EST MOD 30 MIN: CPT | Mod: 25 | Performed by: NURSE PRACTITIONER

## 2019-09-18 PROCEDURE — 90471 IMMUNIZATION ADMIN: CPT | Performed by: NURSE PRACTITIONER

## 2019-09-18 RX ORDER — GLIPIZIDE 5 MG/1
TABLET ORAL
Qty: 180 TAB | Refills: 3 | Status: SHIPPED | OUTPATIENT
Start: 2019-09-18 | End: 2020-09-24 | Stop reason: SDUPTHER

## 2019-09-18 RX ORDER — LEVOTHYROXINE SODIUM 0.15 MG/1
TABLET ORAL
Qty: 90 TAB | Refills: 3 | Status: SHIPPED | OUTPATIENT
Start: 2019-09-18 | End: 2020-09-24 | Stop reason: SDUPTHER

## 2019-09-18 RX ORDER — AMLODIPINE BESYLATE 5 MG/1
TABLET ORAL
Qty: 90 TAB | Refills: 3 | Status: SHIPPED | OUTPATIENT
Start: 2019-09-18 | End: 2020-08-31

## 2019-09-18 RX ORDER — ATORVASTATIN CALCIUM 40 MG/1
40 TABLET, FILM COATED ORAL DAILY
Qty: 90 TAB | Refills: 3 | Status: SHIPPED | OUTPATIENT
Start: 2019-09-18 | End: 2020-08-31

## 2019-09-18 RX ORDER — LOSARTAN POTASSIUM 100 MG/1
TABLET ORAL
Qty: 90 TAB | Refills: 3 | Status: SHIPPED | OUTPATIENT
Start: 2019-09-18 | End: 2020-09-24 | Stop reason: SDUPTHER

## 2019-09-18 ASSESSMENT — PATIENT HEALTH QUESTIONNAIRE - PHQ9: CLINICAL INTERPRETATION OF PHQ2 SCORE: 0

## 2019-09-18 ASSESSMENT — PAIN SCALES - GENERAL: PAINLEVEL: NO PAIN

## 2019-09-18 NOTE — PATIENT INSTRUCTIONS
MEDS REFILLED    WILL INCREASE THE ATORVASTATIN TO 40 MG DAILY    google [facility] exams, they include carotid artery ultrasounds    And see me in December with labs

## 2019-09-18 NOTE — PROGRESS NOTES
Chief Complaint   Patient presents with   • Labs Only     CM labs not complete         This is a 64 y.o.female patient that presents today with the following: Diabetes follow-up    HTN (hypertension)  Chronic, stable, well-controlled medications.  Blood pressure today 122/60 and she denies symptoms of hypertension.  Does need refills on medications, this is called in for her.    Family history of carotid artery stenosis  Patient does have family history of carotid artery disease, particularly her mother.  Her mother did recently have a stroke and was found to have blocked carotid arteries.  She is very concerned and is interested in having a carotid artery ultrasounds.  She does deny any symptoms associated with carotid artery disease.  I did discuss with her that not a candidate for ultrasound in the setting of no symptoms.  I did discuss with her the possibility have having a [employer] screening which we will also do carotid ultrasound as well as other exams at a low cost.  I did discuss with her the importance of watching for signs and symptoms of carotid artery disease and to make a follow-up appointment and we will discuss further evaluation.    Hypothyroidism  This is a chronic and stable condition, well-controlled on current dose of levothyroxine, currently taking 150 micro grams daily.  She does need refills, this is called in for her.  Does deny symptoms of hypothyroidism.    Hyperlipidemia  The 10-year ASCVD risk score ([place] Jr., et al., 2013) is: 11.3%  Patient currently on a atorvastatin, she does agree to increase dose from 20 to 40 mg as her LDL still above 100 and it should be closer to 70 in the setting of her type 2 diabetes    Obesity (BMI 30-39.9)  Chronic, uncontrolled.  Patient's weight is 224 pounds, BMI 36.15.  She does understand the risks associated with her weight and continues to work on this.    Type 2 diabetes mellitus without complication, without long-term current use of insulin  (HCC)  This is a chronic condition, suboptimally controlled on metformin and glipizide.  A1c is 6.9%, last year this time it was 6.4%.  She does not want to make changes to her regimen but will like to work on lifestyle modifications.  She will follow-up with me in 3 months with labs done before visit.  She is going to work on lifestyle modifications including increased physical activity, dietary changes and weight loss efforts.  She is appropriately on statin as well as ARB.  Attempted to get retinal scan, unable to get adequate image.      Hospital Outpatient Visit on 08/27/2019   Component Date Value   • TSH 08/27/2019 0.430    • Cholesterol,Tot 08/27/2019 187    • Triglycerides 08/27/2019 192*   • HDL 08/27/2019 43    • LDL 08/27/2019 106*   • Glycohemoglobin 08/27/2019 6.9*   • Est Avg Glucose 08/27/2019 151    • Fasting Status 08/27/2019 Fasting          clinical course has been stable    Past Medical History:   Diagnosis Date   • Diabetes (HCC)     oral meds   • Dyslipidemia 10/30/2013   • Epistaxis 10/30/2013    6 bloody in last few days Not common for patient Lot of sinus trouble in the last week   • HTN (hypertension) 10/30/2013    Losartan 100 Doesn't check at home   • Renal disorder     kidney stones   • Unspecified hypothyroidism 10/30/2013       Past Surgical History:   Procedure Laterality Date   • CYSTOSCOPY STENT PLACEMENT Right 4/2/2017    Procedure: CYSTOSCOPY STENT PLACEMENT;  Surgeon: Shon Chambers M.D.;  Location: SURGERY [facility];  Service:    • MASS EXCISION GENERAL  7/14/2016    Procedure: MASS EXCISION GENERAL SUBCUTANEOUS NECK;  Surgeon: Bhavik Bowen M.D.;  Location: SURGERY [facility];  Service:    • CATARACT EXTRACTION WITH IOL  2015   • THYROIDECTOMY  1985       Family History   Problem Relation Age of Onset   • Hypertension Mother    • Diabetes Father    • Hypertension Father    • Diabetes Maternal Grandmother    • Heart Disease Maternal Grandmother         CABG   x 5    • Hypertension Maternal Grandmother    • Hypertension Maternal Grandfather    • Hypertension Paternal Grandmother    • Hypertension Paternal Grandfather    • Cancer Neg Hx    • Hyperlipidemia Neg Hx    • Stroke Neg Hx        Morphine    Current Outpatient Medications Ordered in Epic   Medication Sig Dispense Refill   • atorvastatin (LIPITOR) 40 MG Tab Take 1 Tab by mouth every day. 90 Tab 3   • amLODIPine (NORVASC) 5 MG Tab TAKE 1 DAILY 90 Tab 3   • levothyroxine (SYNTHROID) 150 MCG Tab TAKE 1 TABLET BY MOUTH ONCE DAILY 90 Tab 3   • glipiZIDE (GLUCOTROL) 5 MG Tab TAKE 1 TABLET BY MOUTH TWICE DAILY **PATIENT  NEEDS  TO  GET  LABS  DONE** 180 Tab 3   • losartan (COZAAR) 100 MG Tab TAKE 1 TABLET BY MOUTH ONCE DAILY 90 Tab 3   • metformin (GLUCOPHAGE) 1000 MG tablet TAKE 1 TABLET BY MOUTH TWICE DAILY WITH MEALS 180 Tab 3   • ascorbic acid (ASCORBIC ACID) 500 MG Tab Take 500 mg by mouth every day.     • polyethylene glycol 3350 (MIRALAX) Powder      • aspirin (ASA) 81 MG Chew Tab chewable tablet Take 81 mg by mouth every day.     • Multiple Vitamins-Minerals (MULTIVITAL PO) Take 1 Tab by mouth every day.     • NON SPECIFIED Patient has night time desaturation identified at hospital.  OPO 1 Each 0   • NON SPECIFIED opo  G47.34 1 Each 0     No current [facility]-ordered facility-administered medications on file.        Constitutional ROS: No unexpected change in weight, No weakness, No unexplained fevers, sweats, or chills  Pulmonary ROS: No chronic cough, sputum, or hemoptysis, No shortness of breath, No recent change in breathing  Cardiovascular ROS: No chest pain, No edema, No palpitations, Positive for hypertension and hyperlipidemia  Gastrointestinal ROS: No abdominal pain, No nausea, vomiting, diarrhea, or constipation  Musculoskeletal/Extremities ROS: No clubbing, No peripheral edema, No pain, redness or swelling on the joints  Neurologic ROS: Normal development, No seizures, No weakness  Endocrine ROS: Positive  "per HPI    Physical exam:  /68 (BP Location: Left arm, Patient Position: Sitting, BP Cuff Size: Adult)   Pulse 93   Temp 36.9 °C (98.4 °F) (Temporal)   Resp 16   Ht 1.676 m (5' 6\")   Wt 101.6 kg (224 lb)   SpO2 97%   Breastfeeding? No   BMI 36.15 kg/m²   General Appearance: Very pleasant middle-aged older female, alert, no distress, obese, well-groomed  Skin: Skin color, texture, turgor normal. No rashes or lesions.  Neck: negative findings: Negative for bruit  Lungs: negative findings: normal respiratory rate and rhythm, normal effort  Abdomen: Abdomen soft, non-tender. BS normal. No masses,  No organomegaly  Musculoskeletal: negative findings: no evidence of joint instability, no evidence of muscle atrophy, no deformities present  Neurologic: intact, CN II through XII grossly intact    Medical decision making/discussion: Patient's medications refilled, she is to take them as prescribed.  We will increase the atorvastatin 40 mg daily.  She is to follow-up with me in December with labs done before visit.  Attempted to do retinal scan in office but unable to get adequate image thus she will follow-up with her optometrist and ask them to send retinal scan results.  She does agree to get influenza immunization today.  She is going to work on lifestyle modifications including healthy diet, regular exercise and efforts towards weight loss.    Devi was seen today for labs only.    Diagnoses and all orders for this visit:    Screening for colorectal cancer  -     Occult Blood Feces Immunoassay (FIT); Future    Type 2 diabetes mellitus without complication, without long-term current use of insulin (HCC)  -     Cancel: POCT Retinal Eye Exam  -     HEMOGLOBIN A1C; Future  -     MICROALBUMIN CREAT RATIO URINE; Future  -     glipiZIDE (GLUCOTROL) 5 MG Tab; TAKE 1 TABLET BY MOUTH TWICE DAILY  -     metformin (GLUCOPHAGE) 1000 MG tablet; TAKE 1 TABLET BY MOUTH TWICE DAILY WITH MEALS    Essential hypertension  -    "  atorvastatin (LIPITOR) 40 MG Tab; Take 1 Tab by mouth every day.  -     losartan (COZAAR) 100 MG Tab; TAKE 1 TABLET BY MOUTH ONCE DAILY    Hypothyroidism, unspecified type  -     levothyroxine (SYNTHROID) 150 MCG Tab; TAKE 1 TABLET BY MOUTH ONCE DAILY    Mixed hyperlipidemia    Need for influenza vaccination  -     Influenza Vaccine Quad Injection (PF)    Family history of carotid artery stenosis    Obesity (BMI 30-39.9)  -     Patient identified as having weight management issue.  Appropriate orders and counseling given.    Other orders  -     amLODIPine (NORVASC) 5 MG Tab; TAKE 1 DAILY        Return in about 3 months (around 12/18/2019) for Follow-up, Discuss Labs.        Please note that this dictation was created using voice recognition software. I have made every reasonable attempt to correct obvious errors, but I expect that there are errors of grammar and possibly content that I did not discover before finalizing the note.

## 2019-09-19 ENCOUNTER — HOSPITAL ENCOUNTER (OUTPATIENT)
Dept: RADIOLOGY | Facility: MEDICAL CENTER | Age: 64
End: 2019-09-19
Attending: NURSE PRACTITIONER
Payer: COMMERCIAL

## 2019-09-19 DIAGNOSIS — Z12.31 VISIT FOR SCREENING MAMMOGRAM: ICD-10-CM

## 2019-09-19 PROCEDURE — 77063 BREAST TOMOSYNTHESIS BI: CPT

## 2019-09-19 NOTE — ASSESSMENT & PLAN NOTE
This is a chronic and stable condition, well-controlled on current dose of levothyroxine, currently taking 150 micro grams daily.  She does need refills, this is called in for her.  Does deny symptoms of hypothyroidism.

## 2019-09-19 NOTE — ASSESSMENT & PLAN NOTE
The 10-year ASCVD risk score ([place] CR Jr., et al., 2013) is: 11.3%  Patient currently on a atorvastatin, she does agree to increase dose from 20 to 40 mg as her LDL still above 100 and it should be closer to 70 in the setting of her type 2 diabetes

## 2019-09-19 NOTE — ASSESSMENT & PLAN NOTE
Chronic, stable, well-controlled medications.  Blood pressure today 122/60 and she denies symptoms of hypertension.  Does need refills on medications, this is called in for her.

## 2019-09-19 NOTE — ASSESSMENT & PLAN NOTE
Patient does have family history of carotid artery disease, particularly her mother.  Her mother did recently have a stroke and was found to have blocked carotid arteries.  She is very concerned and is interested in having a carotid artery ultrasounds.  She does deny any symptoms associated with carotid artery disease.  I did discuss with her that not a candidate for ultrasound in the setting of no symptoms.  I did discuss with her the possibility have having a [employer] screening which we will also do carotid ultrasound as well as other exams at a low cost.  I did discuss with her the importance of watching for signs and symptoms of carotid artery disease and to make a follow-up appointment and we will discuss further evaluation.

## 2019-09-19 NOTE — ASSESSMENT & PLAN NOTE
Chronic, uncontrolled.  Patient's weight is 224 pounds, BMI 36.15.  She does understand the risks associated with her weight and continues to work on this.

## 2019-09-24 ENCOUNTER — HOSPITAL ENCOUNTER (OUTPATIENT)
Facility: MEDICAL CENTER | Age: 64
End: 2019-09-24
Attending: NURSE PRACTITIONER
Payer: COMMERCIAL

## 2019-09-24 PROCEDURE — 82274 ASSAY TEST FOR BLOOD FECAL: CPT

## 2019-09-25 DIAGNOSIS — Z12.12 SCREENING FOR COLORECTAL CANCER: ICD-10-CM

## 2019-09-25 DIAGNOSIS — Z12.11 SCREENING FOR COLORECTAL CANCER: ICD-10-CM

## 2019-09-25 LAB — HEMOCCULT STL QL IA: NEGATIVE

## 2019-09-27 ENCOUNTER — TELEPHONE (OUTPATIENT)
Dept: MEDICAL GROUP | Facility: PHYSICIAN GROUP | Age: 64
End: 2019-09-27

## 2019-09-27 NOTE — TELEPHONE ENCOUNTER
I sent the below info to patient via a [employer] message a week ago--pt still has not read message. Please call pt with message. Thank you.

## 2019-09-27 NOTE — TELEPHONE ENCOUNTER
----- Message from Devi Caruso sent at 9/27/2019  5:00 AM PDT -----  Regarding: Results  Devi,  I have reviewed the results of you recent mammogram. It is normal, there is no evidence of malignancy. We will have you repeat this in 1 year. If you have any questions or concerns, please do not hesitate to call or send me a message.  Sincerely,  CARINA LoraC

## 2019-09-30 NOTE — TELEPHONE ENCOUNTER
Phone Number Called: [phone] (home)       Call outcome: spoke to patient regarding message below    Message: Patient advised and would like to know about fit test results. Please advise. Thank you.

## 2019-10-02 ENCOUNTER — TELEPHONE (OUTPATIENT)
Dept: MEDICAL GROUP | Facility: PHYSICIAN GROUP | Age: 64
End: 2019-10-02

## 2019-10-02 NOTE — TELEPHONE ENCOUNTER
I sent the below info to patient via a [employer] message a week ago--pt still has not read message. Please call pt with message. Thank you.

## 2019-10-02 NOTE — TELEPHONE ENCOUNTER
----- Message from Devi Caruso sent at 10/2/2019  5:00 AM PDT -----  Regarding: Results  Devi,    Your FIT test (colon cancer screening exam) was negative. Will repeat this in 1 year. If you have any further questions or concerns, please do not hesitate to call or send a message.    Sincerely,  TAMIKO Lora, FNP-C

## 2019-10-03 NOTE — TELEPHONE ENCOUNTER
Phone Number Called: [phone] (home)       Call outcome: spoke to patient regarding message below    Message: patient advised and says thank you.

## 2019-10-04 NOTE — TELEPHONE ENCOUNTER
Phone Number Called: [phone] (home)       Call outcome: spoke to patient regarding message below    Message: patient advised.

## 2019-12-23 ENCOUNTER — OFFICE VISIT (OUTPATIENT)
Dept: MEDICAL GROUP | Facility: PHYSICIAN GROUP | Age: 64
End: 2019-12-23
Payer: COMMERCIAL

## 2019-12-23 ENCOUNTER — HOSPITAL ENCOUNTER (OUTPATIENT)
Dept: LAB | Facility: MEDICAL CENTER | Age: 64
End: 2019-12-23
Attending: NURSE PRACTITIONER
Payer: COMMERCIAL

## 2019-12-23 VITALS
BODY MASS INDEX: 34.75 KG/M2 | OXYGEN SATURATION: 91 % | SYSTOLIC BLOOD PRESSURE: 122 MMHG | HEIGHT: 66 IN | RESPIRATION RATE: 12 BRPM | DIASTOLIC BLOOD PRESSURE: 68 MMHG | HEART RATE: 102 BPM | TEMPERATURE: 97.3 F | WEIGHT: 216.2 LBS

## 2019-12-23 DIAGNOSIS — Z11.59 NEED FOR HEPATITIS C SCREENING TEST: ICD-10-CM

## 2019-12-23 DIAGNOSIS — E11.9 TYPE 2 DIABETES MELLITUS WITHOUT COMPLICATION, WITHOUT LONG-TERM CURRENT USE OF INSULIN (HCC): ICD-10-CM

## 2019-12-23 LAB
ALBUMIN SERPL BCP-MCNC: 4.4 G/DL (ref 3.2–4.9)
ALBUMIN/GLOB SERPL: 1.8 G/DL
ALP SERPL-CCNC: 75 U/L (ref 30–99)
ALT SERPL-CCNC: 24 U/L (ref 2–50)
ANION GAP SERPL CALC-SCNC: 9 MMOL/L (ref 0–11.9)
AST SERPL-CCNC: 16 U/L (ref 12–45)
BILIRUB SERPL-MCNC: 0.6 MG/DL (ref 0.1–1.5)
BUN SERPL-MCNC: 18 MG/DL (ref 8–22)
CALCIUM SERPL-MCNC: 10.1 MG/DL (ref 8.5–10.5)
CHLORIDE SERPL-SCNC: 108 MMOL/L (ref 96–112)
CO2 SERPL-SCNC: 26 MMOL/L (ref 20–33)
CREAT SERPL-MCNC: 0.81 MG/DL (ref 0.5–1.4)
CREAT UR-MCNC: 127.2 MG/DL
GLOBULIN SER CALC-MCNC: 2.5 G/DL (ref 1.9–3.5)
GLUCOSE SERPL-MCNC: 129 MG/DL (ref 65–99)
HBA1C MFR BLD: 6.1 % (ref 0–5.6)
HCV AB S/CO SERPL IA: NEGATIVE
INT CON NEG: NEGATIVE
INT CON POS: POSITIVE
MICROALBUMIN UR-MCNC: 2.3 MG/DL
MICROALBUMIN/CREAT UR: 18 MG/G (ref 0–30)
POTASSIUM SERPL-SCNC: 4.2 MMOL/L (ref 3.6–5.5)
PROT SERPL-MCNC: 6.9 G/DL (ref 6–8.2)
SODIUM SERPL-SCNC: 143 MMOL/L (ref 135–145)

## 2019-12-23 PROCEDURE — 80053 COMPREHEN METABOLIC PANEL: CPT

## 2019-12-23 PROCEDURE — 36415 COLL VENOUS BLD VENIPUNCTURE: CPT

## 2019-12-23 PROCEDURE — 83036 HEMOGLOBIN GLYCOSYLATED A1C: CPT | Performed by: NURSE PRACTITIONER

## 2019-12-23 PROCEDURE — G0472 HEP C SCREEN HIGH RISK/OTHER: HCPCS

## 2019-12-23 PROCEDURE — 82570 ASSAY OF URINE CREATININE: CPT

## 2019-12-23 PROCEDURE — 83036 HEMOGLOBIN GLYCOSYLATED A1C: CPT

## 2019-12-23 PROCEDURE — 82043 UR ALBUMIN QUANTITATIVE: CPT

## 2019-12-23 PROCEDURE — 99214 OFFICE O/P EST MOD 30 MIN: CPT | Performed by: NURSE PRACTITIONER

## 2019-12-23 NOTE — PATIENT INSTRUCTIONS
Will have you decrease the glipizide to once a day    Have urine test today    Follow up with me in 6 months with regular labs

## 2019-12-23 NOTE — PROGRESS NOTES
Chief Complaint   Patient presents with   • Diabetes     3 mos fv         This is a 64 y.o.female patient that presents today with the following: 3-month follow-up, diabetes follow-up    Type 2 diabetes mellitus without complication, without long-term current use of insulin (HCC)  This is chronic and stable, well controlled with current regimen, glipizide and metformin. A1c I%,  down to 6.1, previously 6.9. she has been having low glucose levels in which she is symptomatic. Has also made dietary changes. Will have her decrease the glipizide to once a day. Will repeat labs in 6 months. Appropriately on statin and ACEI      No visits with results within 1 Month(s) from this visit.   Latest known visit with results is:   Hospital Outpatient Visit on 09/24/2019   Component Date Value   • Occult Blood, IA 09/24/2019 Negative          clinical course has been stable    Past Medical History:   Diagnosis Date   • Diabetes (HCC)     oral meds   • Dyslipidemia 10/30/2013   • Epistaxis 10/30/2013    6 bloody in last few days Not common for patient Lot of sinus trouble in the last week   • HTN (hypertension) 10/30/2013    Losartan 100 Doesn't check at home   • Renal disorder     kidney stones   • Unspecified hypothyroidism 10/30/2013       Past Surgical History:   Procedure Laterality Date   • CYSTOSCOPY STENT PLACEMENT Right 4/2/2017    Procedure: CYSTOSCOPY STENT PLACEMENT;  Surgeon: Shon Chambers M.D.;  Location: SURGERY [facility];  Service:    • MASS EXCISION GENERAL  7/14/2016    Procedure: MASS EXCISION GENERAL SUBCUTANEOUS NECK;  Surgeon: Bhavik Bowen M.D.;  Location: SURGERY [facility];  Service:    • CATARACT EXTRACTION WITH IOL  2015   • THYROIDECTOMY  1985       Family History   Problem Relation Age of Onset   • Hypertension Mother    • Diabetes Father    • Hypertension Father    • Diabetes Maternal Grandmother    • Heart Disease Maternal Grandmother         CABG  x 5    • Hypertension Maternal  "Grandmother    • Hypertension Maternal Grandfather    • Hypertension Paternal Grandmother    • Hypertension Paternal Grandfather    • Cancer Neg Hx    • Hyperlipidemia Neg Hx    • Stroke Neg Hx        Morphine    Current Outpatient Medications Ordered in Epic   Medication Sig Dispense Refill   • atorvastatin (LIPITOR) 40 MG Tab Take 1 Tab by mouth every day. 90 Tab 3   • amLODIPine (NORVASC) 5 MG Tab TAKE 1 DAILY 90 Tab 3   • levothyroxine (SYNTHROID) 150 MCG Tab TAKE 1 TABLET BY MOUTH ONCE DAILY 90 Tab 3   • glipiZIDE (GLUCOTROL) 5 MG Tab TAKE 1 TABLET BY MOUTH TWICE DAILY **PATIENT  NEEDS  TO  GET  LABS  DONE** 180 Tab 3   • losartan (COZAAR) 100 MG Tab TAKE 1 TABLET BY MOUTH ONCE DAILY 90 Tab 3   • metformin (GLUCOPHAGE) 1000 MG tablet TAKE 1 TABLET BY MOUTH TWICE DAILY WITH MEALS 180 Tab 3   • ascorbic acid (ASCORBIC ACID) 500 MG Tab Take 500 mg by mouth every day.     • polyethylene glycol 3350 (MIRALAX) Powder      • aspirin (ASA) 81 MG Chew Tab chewable tablet Take 81 mg by mouth every day.     • Multiple Vitamins-Minerals (MULTIVITAL PO) Take 1 Tab by mouth every day.       No current [facility]-ordered facility-administered medications on file.        Constitutional ROS: No unexpected change in weight, No weakness, No unexplained fevers, sweats, or chills  Pulmonary ROS: No chronic cough, sputum, or hemoptysis, No shortness of breath, No recent change in breathing  Cardiovascular ROS: No chest pain  Gastrointestinal ROS: No abdominal pain, No nausea, vomiting, diarrhea, or constipation  Musculoskeletal/Extremities ROS: No clubbing, No peripheral edema, No pain, redness or swelling on the joints  Neurologic ROS: Normal development, No seizures, No weakness  Endocrine ROS: Positive per HPI    Physical exam:  /68 (BP Location: Left arm, Patient Position: Sitting, BP Cuff Size: Adult)   Pulse (!) 102   Temp 36.3 °C (97.3 °F) (Temporal)   Resp 12   Ht 1.676 m (5' 6\")   Wt 98.1 kg (216 lb 3.2 oz)   SpO2 91% "   BMI 34.90 kg/m²   General Appearance: Very pleasant older female, alert, no distress, obese, well-groomed  Skin: Skin color, texture, turgor normal. No rashes or lesions.  Lungs: negative findings: normal respiratory rate and rhythm, lungs clear to auscultation  Heart: negative. RRR without murmur, gallop, or rubs.  No ectopy.  Abdomen: Abdomen soft, non-tender. BS normal. No masses,  No organomegaly  Musculoskeletal: negative findings: no evidence of joint instability, no evidence of muscle atrophy, no deformities present  Neurologic: intact, CN II through XII grossly intact  Diabetic Foot Exam: No ulcers, erythema or skin lesions present, patient tested with monofilament (10g) and tuning fork found to be sensitive bilaterally throughout the ball of the foot, great toe and heel.      Medical decision making/discussion: In the setting of her episodic low blood sugar readings we will have her decrease the glipizide to once a day.  She is going to have labs done as previously ordered to include microalbumin creatinine ratio.  She is otherwise follow-up with me in 6 months with labs done before visit.  She is going to continue with healthy lifestyle modifications including healthy diet, regular exercise and efforts towards weight loss.    Devi was seen today for diabetes.    Diagnoses and all orders for this visit:    Type 2 diabetes mellitus without complication, without long-term current use of insulin (HCC)  -     POCT Hemoglobin A1C  -     Comp Metabolic Panel; Future    Need for hepatitis C screening test  -     HCV Scrn ( [id number] 1xLife); Future        Return in about 6 months (around 2020) for Follow-up, Discuss Labs.        Please note that this dictation was created using voice recognition software. I have made every reasonable attempt to correct obvious errors, but I expect that there are errors of grammar and possibly content that I did not discover before finalizing the note.

## 2019-12-23 NOTE — LETTER
Intern  DAVI Martel.  [address]  [place] [address]  Fax: [phone]   Authorization for Release/Disclosure of   Protected Health Information   Name: JOSEMANUEL KINNEY : 1955 SSN: [id number]   Address: [address]  [place] [address] Phone:    [phone] (home)    I authorize the entity listed below to release/disclose the PHI below to:   Intern/CRYSTAL Martel and CRYSTAL Martel   Provider or Entity Name:  [facility]     Address               [address] [place], [place] [address] [place] [address]   Phone: [phone]    Fax:   Reason for request: continuity of care   Information to be released:    [  ] LAST COLONOSCOPY,  including any PATH REPORT and follow-up  [  ] LAST FIT/COLOGUARD RESULT [  ] LAST DEXA  [  ] LAST MAMMOGRAM  [  ] LAST PAP  [  ] LAST LABS [x] RETINA EXAM REPORT  [  ] IMMUNIZATION RECORDS  [  ] Release all info      [  ] Check here and initial the line next to each item to release ALL health information INCLUDING  _____ Care and treatment for drug and / or alcohol abuse  _____ HIV testing, infection status, or AIDS  _____ Genetic Testing    DATES OF SERVICE OR TIME PERIOD TO BE DISCLOSED: _____________  I understand and acknowledge that:  * This Authorization may be revoked at any time by you in writing, except if your health information has already been used or disclosed.  * Your health information that will be used or disclosed as a result of you signing this authorization could be re-disclosed by the recipient. If this occurs, your re-disclosed health information may no longer be protected by State or Federal laws.  * You may refuse to sign this Authorization. Your refusal will not affect your ability to obtain treatment.  * This Authorization becomes effective upon signing and will  on (date) __________.      If no date is indicated, this Authorization will  one (1) year from the signature date.       Name: Devi Caruso    Signature:   Date:     12/23/2019       PLEASE FAX REQUESTED RECORDS BACK TO: [phone]

## 2019-12-24 LAB
EST. AVERAGE GLUCOSE BLD GHB EST-MCNC: 134 MG/DL
HBA1C MFR BLD: 6.3 % (ref 0–5.6)

## 2019-12-26 DIAGNOSIS — E78.2 MIXED HYPERLIPIDEMIA: ICD-10-CM

## 2019-12-26 DIAGNOSIS — E03.9 HYPOTHYROIDISM, UNSPECIFIED TYPE: ICD-10-CM

## 2019-12-26 DIAGNOSIS — I10 ESSENTIAL HYPERTENSION: ICD-10-CM

## 2019-12-26 DIAGNOSIS — E11.9 TYPE 2 DIABETES MELLITUS WITHOUT COMPLICATION, WITHOUT LONG-TERM CURRENT USE OF INSULIN (HCC): ICD-10-CM

## 2019-12-26 NOTE — ASSESSMENT & PLAN NOTE
This is chronic and stable, well controlled with current regimen, glipizide and metformin. A1c I%,  down to 6.1, previously 6.9. she has been having low glucose levels in which she is symptomatic. Has also made dietary changes. Will have her decrease the glipizide to once a day. Will repeat labs in 6 months. Appropriately on statin and ACEI

## 2020-08-29 DIAGNOSIS — I10 ESSENTIAL HYPERTENSION: ICD-10-CM

## 2020-08-31 RX ORDER — ATORVASTATIN CALCIUM 40 MG/1
TABLET, FILM COATED ORAL
Qty: 90 TAB | Refills: 0 | Status: SHIPPED | OUTPATIENT
Start: 2020-08-31 | End: 2020-09-24 | Stop reason: SDUPTHER

## 2020-08-31 RX ORDER — AMLODIPINE BESYLATE 5 MG/1
TABLET ORAL
Qty: 90 TAB | Refills: 0 | Status: SHIPPED | OUTPATIENT
Start: 2020-08-31 | End: 2020-09-24 | Stop reason: SDUPTHER

## 2020-09-04 ENCOUNTER — HOSPITAL ENCOUNTER (OUTPATIENT)
Dept: LAB | Facility: MEDICAL CENTER | Age: 65
End: 2020-09-04
Attending: NURSE PRACTITIONER
Payer: COMMERCIAL

## 2020-09-04 DIAGNOSIS — I10 ESSENTIAL HYPERTENSION: ICD-10-CM

## 2020-09-04 DIAGNOSIS — E78.2 MIXED HYPERLIPIDEMIA: ICD-10-CM

## 2020-09-04 DIAGNOSIS — E11.9 TYPE 2 DIABETES MELLITUS WITHOUT COMPLICATION, WITHOUT LONG-TERM CURRENT USE OF INSULIN (HCC): ICD-10-CM

## 2020-09-04 DIAGNOSIS — E03.9 HYPOTHYROIDISM, UNSPECIFIED TYPE: ICD-10-CM

## 2020-09-04 LAB
ALBUMIN SERPL BCP-MCNC: 4.2 G/DL (ref 3.2–4.9)
ALBUMIN/GLOB SERPL: 1.8 G/DL
ALP SERPL-CCNC: 80 U/L (ref 30–99)
ALT SERPL-CCNC: 30 U/L (ref 2–50)
ANION GAP SERPL CALC-SCNC: 10 MMOL/L (ref 7–16)
AST SERPL-CCNC: 18 U/L (ref 12–45)
BILIRUB SERPL-MCNC: 0.3 MG/DL (ref 0.1–1.5)
BUN SERPL-MCNC: 17 MG/DL (ref 8–22)
CALCIUM SERPL-MCNC: 10.3 MG/DL (ref 8.5–10.5)
CHLORIDE SERPL-SCNC: 104 MMOL/L (ref 96–112)
CHOLEST SERPL-MCNC: 149 MG/DL (ref 100–199)
CO2 SERPL-SCNC: 26 MMOL/L (ref 20–33)
CREAT SERPL-MCNC: 0.71 MG/DL (ref 0.5–1.4)
EST. AVERAGE GLUCOSE BLD GHB EST-MCNC: 137 MG/DL
FASTING STATUS PATIENT QL REPORTED: NORMAL
GLOBULIN SER CALC-MCNC: 2.4 G/DL (ref 1.9–3.5)
GLUCOSE SERPL-MCNC: 120 MG/DL (ref 65–99)
HBA1C MFR BLD: 6.4 % (ref 0–5.6)
HDLC SERPL-MCNC: 40 MG/DL
LDLC SERPL CALC-MCNC: 74 MG/DL
POTASSIUM SERPL-SCNC: 4.5 MMOL/L (ref 3.6–5.5)
PROT SERPL-MCNC: 6.6 G/DL (ref 6–8.2)
SODIUM SERPL-SCNC: 140 MMOL/L (ref 135–145)
T4 FREE SERPL-MCNC: 1.65 NG/DL (ref 0.93–1.7)
TRIGL SERPL-MCNC: 176 MG/DL (ref 0–149)
TSH SERPL DL<=0.005 MIU/L-ACNC: 0.35 UIU/ML (ref 0.38–5.33)

## 2020-09-04 PROCEDURE — 36415 COLL VENOUS BLD VENIPUNCTURE: CPT

## 2020-09-04 PROCEDURE — 83036 HEMOGLOBIN GLYCOSYLATED A1C: CPT

## 2020-09-04 PROCEDURE — 84443 ASSAY THYROID STIM HORMONE: CPT

## 2020-09-04 PROCEDURE — 84439 ASSAY OF FREE THYROXINE: CPT

## 2020-09-04 PROCEDURE — 80061 LIPID PANEL: CPT

## 2020-09-04 PROCEDURE — 80053 COMPREHEN METABOLIC PANEL: CPT

## 2020-09-19 ENCOUNTER — HOSPITAL ENCOUNTER (OUTPATIENT)
Dept: RADIOLOGY | Facility: MEDICAL CENTER | Age: 65
End: 2020-09-19
Attending: NURSE PRACTITIONER
Payer: COMMERCIAL

## 2020-09-19 DIAGNOSIS — Z12.39 BREAST SCREENING: ICD-10-CM

## 2020-09-19 PROCEDURE — 77067 SCR MAMMO BI INCL CAD: CPT

## 2020-09-24 ENCOUNTER — OFFICE VISIT (OUTPATIENT)
Dept: MEDICAL GROUP | Facility: PHYSICIAN GROUP | Age: 65
End: 2020-09-24
Payer: COMMERCIAL

## 2020-09-24 VITALS
HEART RATE: 91 BPM | HEIGHT: 66 IN | DIASTOLIC BLOOD PRESSURE: 80 MMHG | OXYGEN SATURATION: 93 % | BODY MASS INDEX: 34.55 KG/M2 | TEMPERATURE: 98.5 F | SYSTOLIC BLOOD PRESSURE: 132 MMHG | RESPIRATION RATE: 16 BRPM | WEIGHT: 215 LBS

## 2020-09-24 DIAGNOSIS — E11.9 TYPE 2 DIABETES MELLITUS WITHOUT COMPLICATION, WITHOUT LONG-TERM CURRENT USE OF INSULIN (HCC): ICD-10-CM

## 2020-09-24 DIAGNOSIS — K59.01 SLOW TRANSIT CONSTIPATION: ICD-10-CM

## 2020-09-24 DIAGNOSIS — E03.9 HYPOTHYROIDISM, UNSPECIFIED TYPE: ICD-10-CM

## 2020-09-24 DIAGNOSIS — I10 ESSENTIAL HYPERTENSION: ICD-10-CM

## 2020-09-24 DIAGNOSIS — Z23 NEED FOR INFLUENZA VACCINATION: ICD-10-CM

## 2020-09-24 DIAGNOSIS — R92.8 ABNORMAL MAMMOGRAM: ICD-10-CM

## 2020-09-24 DIAGNOSIS — Z23 NEED FOR VACCINATION: ICD-10-CM

## 2020-09-24 PROCEDURE — 90472 IMMUNIZATION ADMIN EACH ADD: CPT | Performed by: NURSE PRACTITIONER

## 2020-09-24 PROCEDURE — 99214 OFFICE O/P EST MOD 30 MIN: CPT | Mod: 25 | Performed by: NURSE PRACTITIONER

## 2020-09-24 PROCEDURE — 90471 IMMUNIZATION ADMIN: CPT | Performed by: NURSE PRACTITIONER

## 2020-09-24 PROCEDURE — 90686 IIV4 VACC NO PRSV 0.5 ML IM: CPT | Performed by: NURSE PRACTITIONER

## 2020-09-24 PROCEDURE — 90750 HZV VACC RECOMBINANT IM: CPT | Performed by: NURSE PRACTITIONER

## 2020-09-24 RX ORDER — GLIPIZIDE 5 MG/1
TABLET ORAL
Qty: 180 TAB | Refills: 3 | Status: SHIPPED | OUTPATIENT
Start: 2020-09-24 | End: 2021-06-23 | Stop reason: SDUPTHER

## 2020-09-24 RX ORDER — POLYETHYLENE GLYCOL 3350 17 G/17G
17 POWDER, FOR SOLUTION ORAL DAILY
Qty: 850 G | Refills: 1 | Status: SHIPPED | OUTPATIENT
Start: 2020-09-24 | End: 2021-01-28

## 2020-09-24 RX ORDER — LOSARTAN POTASSIUM 100 MG/1
TABLET ORAL
Qty: 90 TAB | Refills: 3 | Status: SHIPPED | OUTPATIENT
Start: 2020-09-24 | End: 2021-06-23 | Stop reason: SDUPTHER

## 2020-09-24 RX ORDER — AMLODIPINE BESYLATE 5 MG/1
TABLET ORAL
Qty: 90 TAB | Refills: 3 | Status: SHIPPED | OUTPATIENT
Start: 2020-09-24 | End: 2021-10-04

## 2020-09-24 RX ORDER — LEVOTHYROXINE SODIUM 0.15 MG/1
TABLET ORAL
Qty: 90 TAB | Refills: 3 | Status: SHIPPED | OUTPATIENT
Start: 2020-09-24

## 2020-09-24 RX ORDER — ATORVASTATIN CALCIUM 40 MG/1
TABLET, FILM COATED ORAL
Qty: 90 TAB | Refills: 3 | Status: SHIPPED | OUTPATIENT
Start: 2020-09-24 | End: 2021-06-23 | Stop reason: SDUPTHER

## 2020-09-24 ASSESSMENT — PATIENT HEALTH QUESTIONNAIRE - PHQ9: CLINICAL INTERPRETATION OF PHQ2 SCORE: 0

## 2020-09-24 NOTE — ASSESSMENT & PLAN NOTE
Chronic and stable  Well controlled on current meds  A1c 6.4  On ARB and statin  Will be due for follow up in 4 months with labs

## 2020-09-24 NOTE — PATIENT INSTRUCTIONS
Flu and shingles shot today    Follow up in 4 months with labs    meds refilled    Will notify you of diagnostic mammogram results

## 2020-09-24 NOTE — ASSESSMENT & PLAN NOTE
Chronic and stable  Well-controlled on current medications  Up-to-date with labs  Does need refills, these were called in

## 2020-09-24 NOTE — ASSESSMENT & PLAN NOTE
Pt will be undergoing diagnostic mammogram and US for recent abnormal mammogram  Will notify of results as soon as they are available for review

## 2020-10-05 ENCOUNTER — HOSPITAL ENCOUNTER (OUTPATIENT)
Dept: RADIOLOGY | Facility: MEDICAL CENTER | Age: 65
End: 2020-10-05
Attending: NURSE PRACTITIONER
Payer: COMMERCIAL

## 2020-10-05 DIAGNOSIS — R92.8 ABNORMAL MAMMOGRAM: ICD-10-CM

## 2020-10-05 PROCEDURE — G0279 TOMOSYNTHESIS, MAMMO: HCPCS | Mod: LT

## 2021-01-27 ENCOUNTER — HOSPITAL ENCOUNTER (OUTPATIENT)
Dept: LAB | Facility: MEDICAL CENTER | Age: 66
End: 2021-01-27
Attending: NURSE PRACTITIONER
Payer: COMMERCIAL

## 2021-01-27 DIAGNOSIS — E11.9 TYPE 2 DIABETES MELLITUS WITHOUT COMPLICATION, WITHOUT LONG-TERM CURRENT USE OF INSULIN (HCC): ICD-10-CM

## 2021-01-27 DIAGNOSIS — E03.9 HYPOTHYROIDISM, UNSPECIFIED TYPE: ICD-10-CM

## 2021-01-27 LAB
EST. AVERAGE GLUCOSE BLD GHB EST-MCNC: 154 MG/DL
HBA1C MFR BLD: 7 % (ref 0–5.6)
TSH SERPL DL<=0.005 MIU/L-ACNC: 1.14 UIU/ML (ref 0.38–5.33)

## 2021-01-27 PROCEDURE — 36415 COLL VENOUS BLD VENIPUNCTURE: CPT

## 2021-01-27 PROCEDURE — 84443 ASSAY THYROID STIM HORMONE: CPT

## 2021-01-27 PROCEDURE — 83036 HEMOGLOBIN GLYCOSYLATED A1C: CPT

## 2021-01-28 ENCOUNTER — TELEMEDICINE (OUTPATIENT)
Dept: MEDICAL GROUP | Facility: PHYSICIAN GROUP | Age: 66
End: 2021-01-28
Payer: COMMERCIAL

## 2021-01-28 VITALS — WEIGHT: 220 LBS | TEMPERATURE: 96.7 F | BODY MASS INDEX: 35.36 KG/M2 | HEIGHT: 66 IN

## 2021-01-28 DIAGNOSIS — I10 ESSENTIAL HYPERTENSION: ICD-10-CM

## 2021-01-28 DIAGNOSIS — Z12.11 SCREENING FOR COLON CANCER: ICD-10-CM

## 2021-01-28 DIAGNOSIS — E78.2 MIXED HYPERLIPIDEMIA: ICD-10-CM

## 2021-01-28 DIAGNOSIS — E11.9 TYPE 2 DIABETES MELLITUS WITHOUT COMPLICATION, WITHOUT LONG-TERM CURRENT USE OF INSULIN (HCC): ICD-10-CM

## 2021-01-28 PROCEDURE — 99214 OFFICE O/P EST MOD 30 MIN: CPT | Mod: 95,CR | Performed by: NURSE PRACTITIONER

## 2021-01-28 ASSESSMENT — PATIENT HEALTH QUESTIONNAIRE - PHQ9: CLINICAL INTERPRETATION OF PHQ2 SCORE: 0

## 2021-01-28 NOTE — PROGRESS NOTES
Virtual Visit: Established Patient   This visit was conducted via Zoom using secure and encrypted videoconferencing technology. The patient was in a private location in the state of [place].    The patient's identity was confirmed and verbal consent was obtained for this virtual visit.    Subjective:   CC:   Chief Complaint   Patient presents with   • Diabetes       Devi Caruso is a 65 y.o. female presenting for evaluation and management of:    HTN (hypertension)  Chronic and stable  Unable to measure blood pressure today as this is a virtual visit  Last measurement well within normal limits  Up-to-date with labs  Will be due for repeat labs in 6 months, these have been ordered  Does not need refills at this time    Type 2 diabetes mellitus without complication, without long-term current use of insulin (HCC)  This is chronic and stable  Mildly worsened since the last time A1c was measured, up to 7.0%, previously 6.4%  She does admit to very poor eating over the holidays and states that her and her  have got back on track with healthy eating  At this time we will not make adjustments to her medications since she has made lifestyle modifications  She is appropriately on ARB and statin  Patient to follow-up in 6 months with labs    Hyperlipidemia  The 10-year ASCVD risk score ([place] DC Jr., et al., 2013) is: 13.1%  Appropriately on statin especially in the setting of her comorbid conditions of diabetes and hypertension  Will be due for labs in 6 months  Advised to continue with healthy lifestyle modifications      ROS   Denies any recent fevers or chills. No nausea or vomiting. No chest pains or shortness of breath.   Endocrine ROS: positive per HPI    Allergies   Allergen Reactions   • Morphine Anaphylaxis and Vomiting     Vomiting, nausea, chills,       Current medicines (including changes today)  Current Outpatient Medications   Medication Sig Dispense Refill   • levothyroxine (SYNTHROID) 150 MCG Tab TAKE  1 TABLET BY MOUTH ONCE DAILY 90 Tab 3   • glipiZIDE (GLUCOTROL) 5 MG Tab TAKE 1 TABLET BY MOUTH TWICE DAILY **PATIENT  NEEDS  TO  GET  LABS  DONE** 180 Tab 3   • losartan (COZAAR) 100 MG Tab TAKE 1 TABLET BY MOUTH ONCE DAILY 90 Tab 3   • metformin (GLUCOPHAGE) 1000 MG tablet TAKE 1 TABLET BY MOUTH TWICE DAILY WITH MEALS 180 Tab 3   • atorvastatin (LIPITOR) 40 MG Tab Take 1 tablet by mouth once daily 90 Tab 3   • amLODIPine (NORVASC) 5 MG Tab Take 1 tablet by mouth once daily 90 Tab 3   • aspirin (ASA) 81 MG Chew Tab chewable tablet Take 81 mg by mouth every day.     • ascorbic acid (ASCORBIC ACID) 500 MG Tab Take 500 mg by mouth every day.       No current facility-administered medications for this visit.        Patient Active Problem List    Diagnosis Date Noted   • Right nephrolithiasis 04/01/2017     Priority: High   • Dyslipidemia 10/30/2013     Priority: High   • Abnormal mammogram 09/24/2020   • Family history of carotid artery stenosis 09/18/2019   • Obesity (BMI 30-39.9) 09/18/2019   • Well woman exam with routine gynecological exam 08/21/2018   • Oxygen desaturation during sleep 04/20/2017   • Hyperlipidemia 04/02/2017   • Goiter 07/14/2016   • Calculus of right kidney 08/04/2015   • Fatty liver 08/04/2015   • Heart valve disorder 07/09/2015   • Vitamin D deficiency 01/23/2014   • Type 2 diabetes mellitus without complication, without long-term current use of insulin (HCC) 01/23/2014   • Hypothyroidism 10/30/2013   • HTN (hypertension) 10/30/2013       Family History   Problem Relation Age of Onset   • Hypertension Mother    • Diabetes Father    • Hypertension Father    • Diabetes Maternal Grandmother    • Heart Disease Maternal Grandmother         CABG  x 5    • Hypertension Maternal Grandmother    • Hypertension Maternal Grandfather    • Hypertension Paternal Grandmother    • Hypertension Paternal Grandfather    • Cancer Neg Hx    • Hyperlipidemia Neg Hx    • Stroke Neg Hx        She  has a past medical  "history of Diabetes (HCC), Dyslipidemia (10/30/2013), Epistaxis (10/30/2013), HTN (hypertension) (10/30/2013), Renal disorder, and Unspecified hypothyroidism (10/30/2013).  She  has a past surgical history that includes cataract extraction with iol (2015); thyroidectomy (1985); mass excision general (7/14/2016); and cystoscopy stent placement (Right, 4/2/2017).       Objective:   Temp 35.9 °C (96.7 °F) (Oral)   Ht 1.676 m (5' 6\")   Wt 99.8 kg (220 lb)   BMI 35.51 kg/m²     Physical Exam:  Constitutional: Alert, no distress, well-groomed.  Skin: No rashes in visible areas.  Eye: Round. Conjunctiva clear, lids normal. No icterus.   ENMT: Lips pink without lesions, good dentition, moist mucous membranes. Phonation normal.  Neck: No masses, no thyromegaly. Moves freely without pain.  Respiratory: Unlabored respiratory effort, no cough or audible wheeze  Psych: Alert and oriented x3, normal affect and mood.       Assessment and Plan:   The following treatment plan was discussed:     1. Type 2 diabetes mellitus without complication, without long-term current use of insulin (HCC)  - Comp Metabolic Panel; Future  - HEMOGLOBIN A1C; Future  - Lipid Profile; Future  - MICROALBUMIN CREAT RATIO URINE; Future    2. Mixed hyperlipidemia  - Comp Metabolic Panel; Future  - Lipid Profile; Future    3. Essential hypertension  - CBC WITH DIFFERENTIAL; Future  - Comp Metabolic Panel; Future  - Lipid Profile; Future    4. Screening for colon cancer  [facility]        Follow-up: Return in about 6 months (around 7/28/2021) for Discuss Labs, Follow-up.         "

## 2021-01-28 NOTE — ASSESSMENT & PLAN NOTE
The 10-year ASCVD risk score ([place] CR Jr., et al., 2013) is: 13.1%  Appropriately on statin especially in the setting of her comorbid conditions of diabetes and hypertension  Will be due for labs in 6 months  Advised to continue with healthy lifestyle modifications

## 2021-01-28 NOTE — ASSESSMENT & PLAN NOTE
This is chronic and stable  Mildly worsened since the last time A1c was measured, up to 7.0%, previously 6.4%  She does admit to very poor eating over the holidays and states that her and her  have got back on track with healthy eating  At this time we will not make adjustments to her medications since she has made lifestyle modifications  She is appropriately on ARB and statin  Patient to follow-up in 6 months with labs

## 2021-01-28 NOTE — ASSESSMENT & PLAN NOTE
Chronic and stable  Unable to measure blood pressure today as this is a virtual visit  Last measurement well within normal limits  Up-to-date with labs  Will be due for repeat labs in 6 months, these have been ordered  Does not need refills at this time

## 2021-06-23 DIAGNOSIS — I10 ESSENTIAL HYPERTENSION: ICD-10-CM

## 2021-06-23 DIAGNOSIS — E11.9 TYPE 2 DIABETES MELLITUS WITHOUT COMPLICATION, WITHOUT LONG-TERM CURRENT USE OF INSULIN (HCC): ICD-10-CM

## 2021-06-23 NOTE — TELEPHONE ENCOUNTER
Received request via: Pharmacy    Was the patient seen in the last year in this department? Yes    Does the patient have an active prescription (recently filled or refills available) for medication(s) requested? No     Last Office Visit:01/28/2021  Last Labs:01/28/2021

## 2021-06-24 RX ORDER — LOSARTAN POTASSIUM 100 MG/1
TABLET ORAL
Qty: 90 TABLET | Refills: 3 | Status: SHIPPED | OUTPATIENT
Start: 2021-06-24

## 2021-06-24 RX ORDER — GLIPIZIDE 5 MG/1
TABLET ORAL
Qty: 180 TABLET | Refills: 3 | Status: SHIPPED | OUTPATIENT
Start: 2021-06-24

## 2021-06-24 RX ORDER — ATORVASTATIN CALCIUM 40 MG/1
TABLET, FILM COATED ORAL
Qty: 90 TABLET | Refills: 3 | Status: SHIPPED | OUTPATIENT
Start: 2021-06-24

## 2021-10-02 DIAGNOSIS — I10 ESSENTIAL HYPERTENSION: ICD-10-CM

## 2021-10-04 NOTE — TELEPHONE ENCOUNTER
Received request via: Pharmacy    Was the patient seen in the last year in this department? Yes    Does the patient have an active prescription (recently filled or refills available) for medication(s) requested? No    Requested Prescriptions     Pending Prescriptions Disp Refills    amLODIPine (NORVASC) 5 MG Tab [Pharmacy Med Name: amLODIPine Besylate 5 MG Oral Tablet] 90 Tablet 0     Sig: Take 1 tablet by mouth once daily

## 2021-10-05 RX ORDER — AMLODIPINE BESYLATE 5 MG/1
TABLET ORAL
Qty: 90 TABLET | Refills: 0 | Status: SHIPPED | OUTPATIENT
Start: 2021-10-05

## 2022-01-18 ENCOUNTER — HOSPITAL ENCOUNTER (OUTPATIENT)
Facility: MEDICAL CENTER | Age: 67
End: 2022-01-18
Attending: PHYSICIAN ASSISTANT
Payer: COMMERCIAL

## 2022-01-19 LAB
FORWARD REASON: SPWHY: NORMAL
FORWARDED TO LAB: SPWHR: NORMAL
SPECIMEN SENT: SPWT1: NORMAL

## 2024-08-29 NOTE — ASSESSMENT & PLAN NOTE
This is a chronic condition, suboptimally controlled on metformin and glipizide.  A1c is 6.9%, last year this time it was 6.4%.  She does not want to make changes to her regimen but will like to work on lifestyle modifications.  She will follow-up with me in 3 months with labs done before visit.  She is going to work on lifestyle modifications including increased physical activity, dietary changes and weight loss efforts.  She is appropriately on statin as well as ARB.  Attempted to get retinal scan, unable to get adequate image.   No

## (undated) DEVICE — SET IRRIGATION CYSTOSCOPY Y-TYPE L81 IN (20EA/CA)

## (undated) DEVICE — PROTECTOR ULNA NERVE - (36PR/CA)

## (undated) DEVICE — TUBING CLEARLINK DUO-VENT - C-FLO (48EA/CA)

## (undated) DEVICE — TOWELS CLOTH SURGICAL - (4/PK 20PK/CA)

## (undated) DEVICE — WIRE GUIDE SENSOR DUAL FLEX - 5/BX

## (undated) DEVICE — GOWN SURGEONS X-LARGE - DISP. (30/CA)

## (undated) DEVICE — KIT ROOM DECONTAMINATION

## (undated) DEVICE — GOWN WARMING STANDARD FLEX - (30/CA)

## (undated) DEVICE — CONNECTOR HOSE NEPTUNE FOR CYSTO ROOM

## (undated) DEVICE — JELLY, KY 2 0Z STERILE

## (undated) DEVICE — HEAD HOLDER JUNIOR/ADULT

## (undated) DEVICE — SUCTION INSTRUMENT YANKAUER BULBOUS TIP W/O VENT (50EA/CA)

## (undated) DEVICE — GOWN SURGICAL X-LARGE ULTRA - FILM-REINFORCED (20/CA)

## (undated) DEVICE — MASK ANESTHESIA ADULT  - (100/CA)

## (undated) DEVICE — TUBE CONNECT SUCTION CLEAR 120 X 1/4" (50EA/CA)"

## (undated) DEVICE — GLOVE BIOGEL ECLIPSE  PF LATEX SIZE 6.5 (50PR/BX)

## (undated) DEVICE — BAG URODRAIN WITH TUBING - (20/CA)

## (undated) DEVICE — LACTATED RINGERS INJ 1000 ML - (14EA/CA 60CA/PF)

## (undated) DEVICE — KIT ANESTHESIA W/CIRCUIT & 3/LT BAG W/FILTER (20EA/CA)

## (undated) DEVICE — SET EXTENSION WITH 2 PORTS (48EA/CA) ***PART #2C8610 IS A SUBSTITUTE*****

## (undated) DEVICE — WATER IRRIG. STER 3000 ML - (4/CA)

## (undated) DEVICE — SLEEVE, VASO, THIGH, MED

## (undated) DEVICE — SENSOR SPO2 NEO LNCS ADHESIVE (20/BX) SEE USER NOTES

## (undated) DEVICE — SET LEADWIRE 5 LEAD BEDSIDE DISPOSABLE ECG (1SET OF 5/EA)

## (undated) DEVICE — LEAD SET 6 DISP. EKG NIHON KOHDEN (100EA/CA) [9859].

## (undated) DEVICE — WATER IRRIG. STER. 1500 ML - (9/CA)

## (undated) DEVICE — SPONGE GAUZESTER 4 X 4 4PLY - (128PK/CA)

## (undated) DEVICE — PACK CYSTOSCOPY III - (8/CA)

## (undated) DEVICE — COVER FOOT UNIVERSAL DISP. - (25EA/CA)

## (undated) DEVICE — GLOVE BIOGEL SZ 7.5 SURGICAL PF LTX - (50PR/BX 4BX/CA)

## (undated) DEVICE — NEPTUNE 4 PORT MANIFOLD - (20/PK)

## (undated) DEVICE — ELECTRODE 850 FOAM ADHESIVE - HYDROGEL RADIOTRNSPRNT (50/PK)

## (undated) DEVICE — SODIUM CHL. IRRIGATION 0.9% 3000ML (4EA/CA 65CA/PF)